# Patient Record
Sex: FEMALE | URBAN - METROPOLITAN AREA
[De-identification: names, ages, dates, MRNs, and addresses within clinical notes are randomized per-mention and may not be internally consistent; named-entity substitution may affect disease eponyms.]

---

## 2021-12-02 ENCOUNTER — INPATIENT (INPATIENT)
Facility: HOSPITAL | Age: 86
LOS: 1 days | DRG: 64 | End: 2021-12-04
Attending: PSYCHIATRY & NEUROLOGY | Admitting: PSYCHIATRY & NEUROLOGY
Payer: MEDICARE

## 2021-12-02 VITALS
HEIGHT: 62 IN | DIASTOLIC BLOOD PRESSURE: 78 MMHG | SYSTOLIC BLOOD PRESSURE: 111 MMHG | RESPIRATION RATE: 14 BRPM | OXYGEN SATURATION: 100 % | HEART RATE: 83 BPM | TEMPERATURE: 90 F

## 2021-12-02 DIAGNOSIS — I60.9 NONTRAUMATIC SUBARACHNOID HEMORRHAGE, UNSPECIFIED: ICD-10-CM

## 2021-12-02 LAB
A1C WITH ESTIMATED AVERAGE GLUCOSE RESULT: 5.9 % — HIGH (ref 4–5.6)
ALBUMIN SERPL ELPH-MCNC: 3.2 G/DL — LOW (ref 3.3–5)
ALBUMIN SERPL ELPH-MCNC: 4.1 G/DL — SIGNIFICANT CHANGE UP (ref 3.3–5)
ALP SERPL-CCNC: 79 U/L — SIGNIFICANT CHANGE UP (ref 40–120)
ALP SERPL-CCNC: 92 U/L — SIGNIFICANT CHANGE UP (ref 40–120)
ALT FLD-CCNC: 18 U/L — SIGNIFICANT CHANGE UP (ref 10–45)
ALT FLD-CCNC: 275 U/L — HIGH (ref 10–45)
AMPHET UR-MCNC: NEGATIVE — SIGNIFICANT CHANGE UP
ANION GAP SERPL CALC-SCNC: 17 MMOL/L — SIGNIFICANT CHANGE UP (ref 5–17)
ANION GAP SERPL CALC-SCNC: 17 MMOL/L — SIGNIFICANT CHANGE UP (ref 5–17)
ANION GAP SERPL CALC-SCNC: 23 MMOL/L — HIGH (ref 5–17)
ANION GAP SERPL CALC-SCNC: 25 MMOL/L — HIGH (ref 5–17)
APTT BLD: 22.6 SEC — LOW (ref 27.5–35.5)
APTT BLD: 24.4 SEC — LOW (ref 27.5–35.5)
AST SERPL-CCNC: 356 U/L — HIGH (ref 10–40)
AST SERPL-CCNC: 36 U/L — SIGNIFICANT CHANGE UP (ref 10–40)
B-OH-BUTYR SERPL-SCNC: 0.1 MMOL/L — SIGNIFICANT CHANGE UP
BARBITURATES UR SCN-MCNC: NEGATIVE — SIGNIFICANT CHANGE UP
BASOPHILS # BLD AUTO: 0.06 K/UL — SIGNIFICANT CHANGE UP (ref 0–0.2)
BASOPHILS NFR BLD AUTO: 0.4 % — SIGNIFICANT CHANGE UP (ref 0–2)
BENZODIAZ UR-MCNC: NEGATIVE — SIGNIFICANT CHANGE UP
BILIRUB SERPL-MCNC: 0.1 MG/DL — LOW (ref 0.2–1.2)
BILIRUB SERPL-MCNC: 0.3 MG/DL — SIGNIFICANT CHANGE UP (ref 0.2–1.2)
BLD GP AB SCN SERPL QL: NEGATIVE — SIGNIFICANT CHANGE UP
BUN SERPL-MCNC: 38 MG/DL — HIGH (ref 7–23)
BUN SERPL-MCNC: 38 MG/DL — HIGH (ref 7–23)
BUN SERPL-MCNC: 39 MG/DL — HIGH (ref 7–23)
BUN SERPL-MCNC: 39 MG/DL — HIGH (ref 7–23)
CALCIUM SERPL-MCNC: 13.4 MG/DL — CRITICAL HIGH (ref 8.4–10.5)
CALCIUM SERPL-MCNC: 8.5 MG/DL — SIGNIFICANT CHANGE UP (ref 8.4–10.5)
CALCIUM SERPL-MCNC: 9.1 MG/DL — SIGNIFICANT CHANGE UP (ref 8.4–10.5)
CALCIUM SERPL-MCNC: 9.4 MG/DL — SIGNIFICANT CHANGE UP (ref 8.4–10.5)
CHLORIDE SERPL-SCNC: 105 MMOL/L — SIGNIFICANT CHANGE UP (ref 96–108)
CHLORIDE SERPL-SCNC: 111 MMOL/L — HIGH (ref 96–108)
CHLORIDE SERPL-SCNC: 92 MMOL/L — LOW (ref 96–108)
CHLORIDE SERPL-SCNC: 97 MMOL/L — SIGNIFICANT CHANGE UP (ref 96–108)
CHOLEST SERPL-MCNC: 148 MG/DL — SIGNIFICANT CHANGE UP
CO2 SERPL-SCNC: 14 MMOL/L — LOW (ref 22–31)
CO2 SERPL-SCNC: 15 MMOL/L — LOW (ref 22–31)
CO2 SERPL-SCNC: 16 MMOL/L — LOW (ref 22–31)
CO2 SERPL-SCNC: 18 MMOL/L — LOW (ref 22–31)
COCAINE METAB.OTHER UR-MCNC: NEGATIVE — SIGNIFICANT CHANGE UP
CREAT SERPL-MCNC: 1.35 MG/DL — HIGH (ref 0.5–1.3)
CREAT SERPL-MCNC: 1.38 MG/DL — HIGH (ref 0.5–1.3)
CREAT SERPL-MCNC: 1.57 MG/DL — HIGH (ref 0.5–1.3)
CREAT SERPL-MCNC: 1.98 MG/DL — HIGH (ref 0.5–1.3)
EOSINOPHIL # BLD AUTO: 0.01 K/UL — SIGNIFICANT CHANGE UP (ref 0–0.5)
EOSINOPHIL NFR BLD AUTO: 0.1 % — SIGNIFICANT CHANGE UP (ref 0–6)
ESTIMATED AVERAGE GLUCOSE: 123 MG/DL — HIGH (ref 68–114)
ETHANOL SERPL-MCNC: SIGNIFICANT CHANGE UP MG/DL (ref 0–10)
GAS PNL BLDA: SIGNIFICANT CHANGE UP
GLUCOSE SERPL-MCNC: 171 MG/DL — HIGH (ref 70–99)
GLUCOSE SERPL-MCNC: 229 MG/DL — HIGH (ref 70–99)
GLUCOSE SERPL-MCNC: 422 MG/DL — HIGH (ref 70–99)
GLUCOSE SERPL-MCNC: 447 MG/DL — HIGH (ref 70–99)
HCT VFR BLD CALC: 27.2 % — LOW (ref 34.5–45)
HCT VFR BLD CALC: 28.7 % — LOW (ref 34.5–45)
HCT VFR BLD CALC: 33.1 % — LOW (ref 34.5–45)
HDLC SERPL-MCNC: 62 MG/DL — SIGNIFICANT CHANGE UP
HGB BLD-MCNC: 10.6 G/DL — LOW (ref 11.5–15.5)
HGB BLD-MCNC: 8.7 G/DL — LOW (ref 11.5–15.5)
HGB BLD-MCNC: 8.7 G/DL — LOW (ref 11.5–15.5)
IMM GRANULOCYTES NFR BLD AUTO: 0.5 % — SIGNIFICANT CHANGE UP (ref 0–1.5)
INR BLD: 0.95 RATIO — SIGNIFICANT CHANGE UP (ref 0.88–1.16)
INR BLD: 0.97 RATIO — SIGNIFICANT CHANGE UP (ref 0.88–1.16)
LACTATE SERPL-SCNC: 10.5 MMOL/L — CRITICAL HIGH (ref 0.7–2)
LACTATE SERPL-SCNC: 7.6 MMOL/L — CRITICAL HIGH (ref 0.7–2)
LIDOCAIN IGE QN: 108 U/L — HIGH (ref 7–60)
LIPID PNL WITH DIRECT LDL SERPL: 78 MG/DL — SIGNIFICANT CHANGE UP
LYMPHOCYTES # BLD AUTO: 1.9 K/UL — SIGNIFICANT CHANGE UP (ref 1–3.3)
LYMPHOCYTES # BLD AUTO: 13.6 % — SIGNIFICANT CHANGE UP (ref 13–44)
MAGNESIUM SERPL-MCNC: 2.4 MG/DL — SIGNIFICANT CHANGE UP (ref 1.6–2.6)
MAGNESIUM SERPL-MCNC: 2.6 MG/DL — SIGNIFICANT CHANGE UP (ref 1.6–2.6)
MAGNESIUM SERPL-MCNC: 2.8 MG/DL — HIGH (ref 1.6–2.6)
MCHC RBC-ENTMCNC: 30.3 GM/DL — LOW (ref 32–36)
MCHC RBC-ENTMCNC: 30.9 PG — SIGNIFICANT CHANGE UP (ref 27–34)
MCHC RBC-ENTMCNC: 32 GM/DL — SIGNIFICANT CHANGE UP (ref 32–36)
MCHC RBC-ENTMCNC: 32 GM/DL — SIGNIFICANT CHANGE UP (ref 32–36)
MCV RBC AUTO: 101.8 FL — HIGH (ref 80–100)
MCV RBC AUTO: 96.5 FL — SIGNIFICANT CHANGE UP (ref 80–100)
MCV RBC AUTO: 96.5 FL — SIGNIFICANT CHANGE UP (ref 80–100)
METHADONE UR-MCNC: NEGATIVE — SIGNIFICANT CHANGE UP
MONOCYTES # BLD AUTO: 1.36 K/UL — HIGH (ref 0–0.9)
MONOCYTES NFR BLD AUTO: 9.7 % — SIGNIFICANT CHANGE UP (ref 2–14)
NEUTROPHILS # BLD AUTO: 10.58 K/UL — HIGH (ref 1.8–7.4)
NEUTROPHILS NFR BLD AUTO: 75.7 % — SIGNIFICANT CHANGE UP (ref 43–77)
NON HDL CHOLESTEROL: 85 MG/DL — SIGNIFICANT CHANGE UP
NRBC # BLD: 0 /100 WBCS — SIGNIFICANT CHANGE UP (ref 0–0)
OPIATES UR-MCNC: NEGATIVE — SIGNIFICANT CHANGE UP
OXYCODONE UR-MCNC: NEGATIVE — SIGNIFICANT CHANGE UP
PCP SPEC-MCNC: SIGNIFICANT CHANGE UP
PCP UR-MCNC: NEGATIVE — SIGNIFICANT CHANGE UP
PHOSPHATE SERPL-MCNC: 3.4 MG/DL — SIGNIFICANT CHANGE UP (ref 2.5–4.5)
PHOSPHATE SERPL-MCNC: 3.5 MG/DL — SIGNIFICANT CHANGE UP (ref 2.5–4.5)
PHOSPHATE SERPL-MCNC: 5 MG/DL — HIGH (ref 2.5–4.5)
PLATELET # BLD AUTO: 125 K/UL — LOW (ref 150–400)
PLATELET # BLD AUTO: 126 K/UL — LOW (ref 150–400)
PLATELET # BLD AUTO: 134 K/UL — LOW (ref 150–400)
POTASSIUM SERPL-MCNC: 3.4 MMOL/L — LOW (ref 3.5–5.3)
POTASSIUM SERPL-MCNC: 3.6 MMOL/L — SIGNIFICANT CHANGE UP (ref 3.5–5.3)
POTASSIUM SERPL-MCNC: 5.5 MMOL/L — HIGH (ref 3.5–5.3)
POTASSIUM SERPL-MCNC: 6.5 MMOL/L — CRITICAL HIGH (ref 3.5–5.3)
POTASSIUM SERPL-SCNC: 3.4 MMOL/L — LOW (ref 3.5–5.3)
POTASSIUM SERPL-SCNC: 3.6 MMOL/L — SIGNIFICANT CHANGE UP (ref 3.5–5.3)
POTASSIUM SERPL-SCNC: 5.5 MMOL/L — HIGH (ref 3.5–5.3)
POTASSIUM SERPL-SCNC: 6.5 MMOL/L — CRITICAL HIGH (ref 3.5–5.3)
PROT SERPL-MCNC: 6.2 G/DL — SIGNIFICANT CHANGE UP (ref 6–8.3)
PROT SERPL-MCNC: 7.9 G/DL — SIGNIFICANT CHANGE UP (ref 6–8.3)
PROTHROM AB SERPL-ACNC: 11.4 SEC — SIGNIFICANT CHANGE UP (ref 10.6–13.6)
PROTHROM AB SERPL-ACNC: 11.6 SEC — SIGNIFICANT CHANGE UP (ref 10.6–13.6)
RBC # BLD: 2.82 M/UL — LOW (ref 3.8–5.2)
RBC # BLD: 2.82 M/UL — LOW (ref 3.8–5.2)
RBC # BLD: 3.43 M/UL — LOW (ref 3.8–5.2)
RBC # FLD: 14.1 % — SIGNIFICANT CHANGE UP (ref 10.3–14.5)
RBC # FLD: 14.1 % — SIGNIFICANT CHANGE UP (ref 10.3–14.5)
RBC # FLD: 14.6 % — HIGH (ref 10.3–14.5)
RH IG SCN BLD-IMP: POSITIVE — SIGNIFICANT CHANGE UP
SARS-COV-2 RNA SPEC QL NAA+PROBE: SIGNIFICANT CHANGE UP
SODIUM SERPL-SCNC: 131 MMOL/L — LOW (ref 135–145)
SODIUM SERPL-SCNC: 137 MMOL/L — SIGNIFICANT CHANGE UP (ref 135–145)
SODIUM SERPL-SCNC: 140 MMOL/L — SIGNIFICANT CHANGE UP (ref 135–145)
SODIUM SERPL-SCNC: 142 MMOL/L — SIGNIFICANT CHANGE UP (ref 135–145)
T3 SERPL-MCNC: 77 NG/DL — LOW (ref 80–200)
T4 AB SER-ACNC: 6.1 UG/DL — SIGNIFICANT CHANGE UP (ref 4.6–12)
THC UR QL: NEGATIVE — SIGNIFICANT CHANGE UP
TRIGL SERPL-MCNC: 36 MG/DL — SIGNIFICANT CHANGE UP
TSH SERPL-MCNC: 2.51 UIU/ML — SIGNIFICANT CHANGE UP (ref 0.27–4.2)
WBC # BLD: 11.88 K/UL — HIGH (ref 3.8–10.5)
WBC # BLD: 13.98 K/UL — HIGH (ref 3.8–10.5)
WBC # BLD: 7.85 K/UL — SIGNIFICANT CHANGE UP (ref 3.8–10.5)
WBC # FLD AUTO: 11.88 K/UL — HIGH (ref 3.8–10.5)
WBC # FLD AUTO: 13.98 K/UL — HIGH (ref 3.8–10.5)
WBC # FLD AUTO: 7.85 K/UL — SIGNIFICANT CHANGE UP (ref 3.8–10.5)

## 2021-12-02 PROCEDURE — 71045 X-RAY EXAM CHEST 1 VIEW: CPT | Mod: 26

## 2021-12-02 PROCEDURE — 70496 CT ANGIOGRAPHY HEAD: CPT | Mod: 26

## 2021-12-02 PROCEDURE — 99223 1ST HOSP IP/OBS HIGH 75: CPT

## 2021-12-02 PROCEDURE — 99291 CRITICAL CARE FIRST HOUR: CPT | Mod: 25

## 2021-12-02 PROCEDURE — 71260 CT THORAX DX C+: CPT | Mod: 26,MA

## 2021-12-02 PROCEDURE — 99291 CRITICAL CARE FIRST HOUR: CPT

## 2021-12-02 PROCEDURE — 72125 CT NECK SPINE W/O DYE: CPT | Mod: 26

## 2021-12-02 PROCEDURE — 93010 ELECTROCARDIOGRAM REPORT: CPT

## 2021-12-02 PROCEDURE — 93306 TTE W/DOPPLER COMPLETE: CPT | Mod: 26

## 2021-12-02 PROCEDURE — 99285 EMERGENCY DEPT VISIT HI MDM: CPT

## 2021-12-02 PROCEDURE — 36620 INSERTION CATHETER ARTERY: CPT

## 2021-12-02 PROCEDURE — 36556 INSERT NON-TUNNEL CV CATH: CPT

## 2021-12-02 PROCEDURE — 70498 CT ANGIOGRAPHY NECK: CPT | Mod: 26

## 2021-12-02 PROCEDURE — 74177 CT ABD & PELVIS W/CONTRAST: CPT | Mod: 26

## 2021-12-02 PROCEDURE — 72170 X-RAY EXAM OF PELVIS: CPT | Mod: 26

## 2021-12-02 RX ORDER — SODIUM ZIRCONIUM CYCLOSILICATE 10 G/10G
10 POWDER, FOR SUSPENSION ORAL ONCE
Refills: 0 | Status: COMPLETED | OUTPATIENT
Start: 2021-12-02 | End: 2021-12-02

## 2021-12-02 RX ORDER — FENTANYL CITRATE 50 UG/ML
25 INJECTION INTRAVENOUS ONCE
Refills: 0 | Status: DISCONTINUED | OUTPATIENT
Start: 2021-12-02 | End: 2021-12-02

## 2021-12-02 RX ORDER — SODIUM CHLORIDE 9 MG/ML
1000 INJECTION INTRAMUSCULAR; INTRAVENOUS; SUBCUTANEOUS ONCE
Refills: 0 | Status: COMPLETED | OUTPATIENT
Start: 2021-12-02 | End: 2021-12-02

## 2021-12-02 RX ORDER — LORATADINE 10 MG/1
10 TABLET ORAL DAILY
Refills: 0 | Status: DISCONTINUED | OUTPATIENT
Start: 2021-12-02 | End: 2021-12-02

## 2021-12-02 RX ORDER — NOREPINEPHRINE BITARTRATE/D5W 8 MG/250ML
0.05 PLASTIC BAG, INJECTION (ML) INTRAVENOUS
Qty: 16 | Refills: 0 | Status: DISCONTINUED | OUTPATIENT
Start: 2021-12-02 | End: 2021-12-03

## 2021-12-02 RX ORDER — MEMANTINE HYDROCHLORIDE 10 MG/1
5 TABLET ORAL DAILY
Refills: 0 | Status: DISCONTINUED | OUTPATIENT
Start: 2021-12-02 | End: 2021-12-02

## 2021-12-02 RX ORDER — HYDRALAZINE HCL 50 MG
10 TABLET ORAL ONCE
Refills: 0 | Status: DISCONTINUED | OUTPATIENT
Start: 2021-12-02 | End: 2021-12-02

## 2021-12-02 RX ORDER — FUROSEMIDE 40 MG
10 TABLET ORAL ONCE
Refills: 0 | Status: COMPLETED | OUTPATIENT
Start: 2021-12-02 | End: 2021-12-02

## 2021-12-02 RX ORDER — INSULIN HUMAN 100 [IU]/ML
2 INJECTION, SOLUTION SUBCUTANEOUS
Qty: 100 | Refills: 0 | Status: DISCONTINUED | OUTPATIENT
Start: 2021-12-02 | End: 2021-12-03

## 2021-12-02 RX ORDER — SODIUM CHLORIDE 9 MG/ML
1000 INJECTION, SOLUTION INTRAVENOUS
Refills: 0 | Status: DISCONTINUED | OUTPATIENT
Start: 2021-12-02 | End: 2021-12-02

## 2021-12-02 RX ORDER — DEXTROSE 50 % IN WATER 50 %
50 SYRINGE (ML) INTRAVENOUS
Refills: 0 | Status: DISCONTINUED | OUTPATIENT
Start: 2021-12-02 | End: 2021-12-02

## 2021-12-02 RX ORDER — LATANOPROST 0.05 MG/ML
1 SOLUTION/ DROPS OPHTHALMIC; TOPICAL AT BEDTIME
Refills: 0 | Status: DISCONTINUED | OUTPATIENT
Start: 2021-12-02 | End: 2021-12-04

## 2021-12-02 RX ORDER — INSULIN LISPRO 100/ML
VIAL (ML) SUBCUTANEOUS EVERY 6 HOURS
Refills: 0 | Status: DISCONTINUED | OUTPATIENT
Start: 2021-12-02 | End: 2021-12-02

## 2021-12-02 RX ORDER — HYDRALAZINE HCL 50 MG
10 TABLET ORAL ONCE
Refills: 0 | Status: COMPLETED | OUTPATIENT
Start: 2021-12-02 | End: 2021-12-02

## 2021-12-02 RX ORDER — CHLORHEXIDINE GLUCONATE 213 G/1000ML
15 SOLUTION TOPICAL EVERY 12 HOURS
Refills: 0 | Status: DISCONTINUED | OUTPATIENT
Start: 2021-12-02 | End: 2021-12-04

## 2021-12-02 RX ORDER — INSULIN HUMAN 100 [IU]/ML
6 INJECTION, SOLUTION SUBCUTANEOUS
Qty: 50 | Refills: 0 | Status: DISCONTINUED | OUTPATIENT
Start: 2021-12-02 | End: 2021-12-02

## 2021-12-02 RX ORDER — PANTOPRAZOLE SODIUM 20 MG/1
40 TABLET, DELAYED RELEASE ORAL DAILY
Refills: 0 | Status: DISCONTINUED | OUTPATIENT
Start: 2021-12-02 | End: 2021-12-04

## 2021-12-02 RX ORDER — CHLORHEXIDINE GLUCONATE 213 G/1000ML
1 SOLUTION TOPICAL ONCE
Refills: 0 | Status: COMPLETED | OUTPATIENT
Start: 2021-12-02 | End: 2021-12-03

## 2021-12-02 RX ORDER — DEXTROSE 50 % IN WATER 50 %
50 SYRINGE (ML) INTRAVENOUS
Refills: 0 | Status: DISCONTINUED | OUTPATIENT
Start: 2021-12-02 | End: 2021-12-04

## 2021-12-02 RX ORDER — INSULIN HUMAN 100 [IU]/ML
6 INJECTION, SOLUTION SUBCUTANEOUS
Qty: 100 | Refills: 0 | Status: DISCONTINUED | OUTPATIENT
Start: 2021-12-02 | End: 2021-12-02

## 2021-12-02 RX ORDER — SENNA PLUS 8.6 MG/1
2 TABLET ORAL AT BEDTIME
Refills: 0 | Status: DISCONTINUED | OUTPATIENT
Start: 2021-12-02 | End: 2021-12-04

## 2021-12-02 RX ORDER — SODIUM CHLORIDE 5 G/100ML
1000 INJECTION, SOLUTION INTRAVENOUS
Refills: 0 | Status: DISCONTINUED | OUTPATIENT
Start: 2021-12-02 | End: 2021-12-02

## 2021-12-02 RX ORDER — SODIUM CHLORIDE 9 MG/ML
2 INJECTION INTRAMUSCULAR; INTRAVENOUS; SUBCUTANEOUS EVERY 6 HOURS
Refills: 0 | Status: DISCONTINUED | OUTPATIENT
Start: 2021-12-02 | End: 2021-12-04

## 2021-12-02 RX ORDER — LACOSAMIDE 50 MG/1
100 TABLET ORAL EVERY 12 HOURS
Refills: 0 | Status: DISCONTINUED | OUTPATIENT
Start: 2021-12-02 | End: 2021-12-04

## 2021-12-02 RX ORDER — POTASSIUM CHLORIDE 20 MEQ
40 PACKET (EA) ORAL ONCE
Refills: 0 | Status: COMPLETED | OUTPATIENT
Start: 2021-12-02 | End: 2021-12-02

## 2021-12-02 RX ORDER — POTASSIUM CHLORIDE 20 MEQ
10 PACKET (EA) ORAL EVERY 4 HOURS
Refills: 0 | Status: DISCONTINUED | OUTPATIENT
Start: 2021-12-02 | End: 2021-12-02

## 2021-12-02 RX ORDER — LEVOTHYROXINE SODIUM 125 MCG
88 TABLET ORAL DAILY
Refills: 0 | Status: DISCONTINUED | OUTPATIENT
Start: 2021-12-03 | End: 2021-12-04

## 2021-12-02 RX ORDER — SENNA PLUS 8.6 MG/1
2 TABLET ORAL AT BEDTIME
Refills: 0 | Status: DISCONTINUED | OUTPATIENT
Start: 2021-12-02 | End: 2021-12-02

## 2021-12-02 RX ORDER — SODIUM CHLORIDE 5 G/100ML
1000 INJECTION, SOLUTION INTRAVENOUS
Refills: 0 | Status: DISCONTINUED | OUTPATIENT
Start: 2021-12-02 | End: 2021-12-03

## 2021-12-02 RX ORDER — DONEPEZIL HYDROCHLORIDE 10 MG/1
10 TABLET, FILM COATED ORAL AT BEDTIME
Refills: 0 | Status: DISCONTINUED | OUTPATIENT
Start: 2021-12-02 | End: 2021-12-02

## 2021-12-02 RX ADMIN — Medication 40 MILLIEQUIVALENT(S): at 13:13

## 2021-12-02 RX ADMIN — Medication 100 MILLIEQUIVALENT(S): at 17:03

## 2021-12-02 RX ADMIN — Medication 10 MILLIGRAM(S): at 18:05

## 2021-12-02 RX ADMIN — Medication 10 MILLIGRAM(S): at 20:42

## 2021-12-02 RX ADMIN — LACOSAMIDE 120 MILLIGRAM(S): 50 TABLET ORAL at 17:10

## 2021-12-02 RX ADMIN — SODIUM CHLORIDE 50 MILLILITER(S): 5 INJECTION, SOLUTION INTRAVENOUS at 13:14

## 2021-12-02 RX ADMIN — SODIUM CHLORIDE 1000 MILLILITER(S): 9 INJECTION INTRAMUSCULAR; INTRAVENOUS; SUBCUTANEOUS at 16:11

## 2021-12-02 RX ADMIN — CHLORHEXIDINE GLUCONATE 15 MILLILITER(S): 213 SOLUTION TOPICAL at 17:03

## 2021-12-02 RX ADMIN — PANTOPRAZOLE SODIUM 40 MILLIGRAM(S): 20 TABLET, DELAYED RELEASE ORAL at 13:35

## 2021-12-02 RX ADMIN — SODIUM CHLORIDE 2 GRAM(S): 9 INJECTION INTRAMUSCULAR; INTRAVENOUS; SUBCUTANEOUS at 17:03

## 2021-12-02 RX ADMIN — INSULIN HUMAN 6 UNIT(S)/HR: 100 INJECTION, SOLUTION SUBCUTANEOUS at 13:14

## 2021-12-02 RX ADMIN — Medication 100 MILLIEQUIVALENT(S): at 13:34

## 2021-12-02 RX ADMIN — Medication 50 MILLILITER(S): at 23:03

## 2021-12-02 RX ADMIN — FENTANYL CITRATE 25 MICROGRAM(S): 50 INJECTION INTRAVENOUS at 18:15

## 2021-12-02 RX ADMIN — SODIUM ZIRCONIUM CYCLOSILICATE 10 GRAM(S): 10 POWDER, FOR SUSPENSION ORAL at 23:57

## 2021-12-02 NOTE — PROVIDER CONTACT NOTE (OTHER) - REASON
patient bradycardic and unable to obtain noninvasive blood pressure
Patient tachycardic and newly converted to afib and tachypenic

## 2021-12-02 NOTE — ED PROVIDER NOTE - PROGRESS NOTE DETAILS
nsx requesting mannitol - trauma agreeable bc low suspcion for other traumatic injury -- isai nsx - will start cardene again in icu after trauma scan read --

## 2021-12-02 NOTE — CONSULT NOTE ADULT - ATTENDING COMMENTS
Pt seen and examined during level one trauma activation. Pt transferred from [facility] - found in bathtub, unconscious. Was intubated (unknown if in field or at [place]), was found to be hypertensive and CT head showed significant ICH, so started on Cardene, received Keppra. Cardene then stopped for hypotension. Pt received etomidate/ theresa for intubation and no other sedative medications afterwards.     In trauma bay:  Airway confirmed with ETCO2  B/L BS present, O2 sat 100% on FiO2 100% (vent)  SBP 130s-150s, HR 70s, pIV access established  GCS 3T, not moving any extremities, pupils unequal but both unresponsive to light  Full exposure    Secondary survey with no signs of traumatic injury. No rectal tone on SHANIA.    CXR not done in trauma bay as pt with B/L BS and intention to obtain repeat CTs asap. CT head, C-spine, CTA head/neck, C/A/P obtained to look for intracranial aneurysm as well as traumatic injuries. Pt then taken to NSCU. Pt d/w Dr. Andrews (ED attending) and NSG.    On my review of CT images, large R SDH/IPH/SAH/IVH with 1.7cm leftward shift. No aneurysm noted. No traumatic injuries noted. Labs reviewed.    - Given pt's extremely grim prognosis (advanced age, poor GCS, significant ICH) and negative imaging, no further trauma workup is indicated.

## 2021-12-02 NOTE — CONSULT NOTE ADULT - ASSESSMENT
ASSESSMENT:  89y old f found down with intracranial hemorrhage seen on CTH in outside hospital, patient was unresponsive, intubated there, transferred to [facility] for neurosurgery/neuro ICU care. Level 1 trauma called here. Primary survey intact, airway secured. Secondary survey b/l dilated unresponsive pupils, absent rectal tone. No external signs of trauma identified on primary/secondary survey.    PLAN:    - follow up full trauma imaging  - evaluated by neurosurgery in trauma bay  - planned admission to neuro ICU  - no external signs of traumatic injuries identified on primary and secondary survey ASSESSMENT:  89y old f found down with intracranial hemorrhage seen on CTH in outside hospital, patient was unresponsive, intubated there, transferred to [facility] for neurosurgery/neuro ICU care. Level 1 trauma called here. Primary survey intact, airway secured. Secondary survey b/l dilated unresponsive pupils, absent rectal tone. No external signs of trauma identified on primary/secondary survey.    PLAN:    - follow up full trauma imaging  - evaluated by neurosurgery in trauma bay  - planned admission to neuro ICU  - mannitol for ICP management  - no external signs of traumatic injuries identified on primary and secondary survey

## 2021-12-02 NOTE — ED PROVIDER NOTE - CARE PLAN
1 Principal Discharge DX:	Subarachnoid hemorrhage   Principal Discharge DX:	Subarachnoid hemorrhage  Secondary Diagnosis:	ICH (intracerebral hemorrhage)

## 2021-12-02 NOTE — CHART NOTE - NSCHARTNOTEFT_GEN_A_CORE
Patient with bradycardia which progressed to PEA arrest. CPR was immediately started with 4 rounds of epinephrine IV given. Bedside RN noted that patient given several runs of potassium during day shift, so given concern for hyperkalemia 2g calcium chloride given. Efforts to obtain labs but unable. ROSC obtained in 12 minutes. She was then noted be in SVT. Emergent central line and arterial line placed. Labs sent. She became hypotensive and again went into PEA arrest at 9:15PM with immediate CPR started. She got 2 rounds of epi and ROSC obtained in 4 minutes. She was in SVT. Given concern she would become hypotensive again, pressors were brought available in case of need. She did become hypotensive, so phenylephrine gtt started. She did not respond to this, so norepinephrine gtt started. Given concern for acidosis post arrest x 2, given 2amps bicarbonate. ABG with K+ of 7.8 and bedside FS in 230s. Started on insulin gtt and ordered for Lokelma. Awaiting BMP result to follow K+. Daughter called to update. Patient made DNR given poor neuro prognosis on admission and current tenuous hemodynamic state.

## 2021-12-02 NOTE — PROGRESS NOTE ADULT - ASSESSMENT
ASSESSMENT: ICH/IVH, brain compression  DKA    NEURO:  - Nchecks q1h  - Family will arrive in 1 hour, ongoing GOC with family  - not a candidate for surgical intervention    PULM:  - ETT to vent  - vap bundle  - repeat gas    CV:  - Keep -160mmHg  - repeat EKG  - Tammie, TLC    RENAL:   - HTS 2% 50 for goal 145-155  - Montes  - repeat BMP, EKG  - strict IOs    GI:  - NPO, NGT  - GI prophylaxis PPI  - Bowel regimen     ENDO:   s/p insulin drip for DKA during day  - Goal euglycemia (-180)  - monitor FS      HEME/ONC:  - SCDs   - hold chemoprophylaxis due to fresh bleed    ID:  - monitor for fevers, hypothermic during the day placed on warming blanket  - f/u cultures    MISC:    SOCIAL/FAMILY:  [x] daughter Argelia updated over the phone, will be here tonight    CODE STATUS:  [] Full Code [x] DNR [] DNI [] Palliative/Comfort Care    DISPOSITION:  [x] ICU [] Stroke Unit [] Floor [] EMU [] RCU [] PCU    [x] Patient is at high risk of neurologic deterioration/death due to: ICH/IVH, brain compression, respiratory failure, cardiac arrest    Contact: [phone] ASSESSMENT: ICH/IVH, brain compression  DKA    NEURO:  - Nchecks q1h  - Family will arrive in 1 hour, ongoing GOC with family  - not a candidate for surgical intervention    PULM:  - ETT to vent  - vap bundle  - repeat gas  - CXR to assess tube positioning/fx post chest compressions    CV:  - Keep -160mmHg  - repeat EKG  - Tammie, TLC  - trend lactate, IVB  - troponin    RENAL:   - HTS 2% 50 for goal 145-155  - Montes  - repeat BMP, EKG  - strict IOs    GI:  - NPO, NGT  - GI prophylaxis PPI  - Bowel regimen     ENDO:   s/p insulin drip for DKA during day  - Goal euglycemia (-180)  - monitor FS      HEME/ONC:  - SCDs   - hold chemoprophylaxis due to fresh bleed    ID:  - monitor for fevers, hypothermic during the day placed on warming blanket  - f/u cultures    MISC:    SOCIAL/FAMILY:  [x] daughter Argelia updated over the phone, will be here tonight    CODE STATUS:  [] Full Code [x] DNR [] DNI [] Palliative/Comfort Care    DISPOSITION:  [x] ICU [] Stroke Unit [] Floor [] EMU [] RCU [] PCU    [x] Patient is at high risk of neurologic deterioration/death due to: ICH/IVH, brain compression, respiratory failure, cardiac arrest    Contact: [phone] ASSESSMENT: ICH/IVH, brain compression  DKA    NEURO:  - Nchecks q1h  - Family will arrive in 1 hour, ongoing GOC with family  - not a candidate for surgical intervention    PULM:  - ETT to vent  - vap bundle  - repeat gas  - CXR to assess tube positioning/fx post chest compressions    CV:  - Keep -160mmHg  - repeat EKG  - Tammie, TLC  - trend lactate, IVB  - troponin    RENAL:   - HTS 2% 50 for goal 145-155  - Montes  - repeat BMP, EKG  - strict IOs  - Monitor K+    GI:  - NPO, NGT  - GI prophylaxis PPI  - Bowel regimen     ENDO:   s/p insulin drip for DKA during day  - Goal euglycemia (-180)  - monitor FS      HEME/ONC:  - SCDs   - hold chemoprophylaxis due to fresh bleed    ID:  - monitor for fevers, hypothermic during the day placed on warming blanket  - f/u cultures    MISC:    SOCIAL/FAMILY:  [x] daughter Argelia updated over the phone, will be here tonight    CODE STATUS:  [] Full Code [x] DNR [] DNI [] Palliative/Comfort Care    DISPOSITION:  [x] ICU [] Stroke Unit [] Floor [] EMU [] RCU [] PCU    [x] Patient is at high risk of neurologic deterioration/death due to: ICH/IVH, brain compression, respiratory failure, cardiac arrest    Contact: [phone]

## 2021-12-02 NOTE — DISCHARGE NOTE NURSING/CASE MANAGEMENT/SOCIAL WORK - PATIENT PORTAL LINK FT
You can access the FollowMyHealth Patient Portal offered by [facility] by registering at the following website: [email or web address]. By joining [facility]’s FollowMyHealth portal, you will also be able to view your health information using other applications (apps) compatible with our system.

## 2021-12-02 NOTE — ED PROVIDER NOTE - PHYSICAL EXAMINATION
Gen: intubated, off sedation unresponsive   HEENT: EOMI, no nasal discharge, mucous membranes moist  CV: RRR, +S1/S2, no M/R/G  Resp: CTAB, no W/R/R  GI: Abdomen soft non-distended, NTTP, no masses  MSK: No open wounds, no bruising, no LE edema  Neuro: A0x0, off sedation w/o extremity movement, pupils fixed/unequal

## 2021-12-02 NOTE — DISCHARGE NOTE FOR THE EXPIRED PATIENT - HOSPITAL COURSE
89 year old female with past medical history of hypertension and hypothyroidism, transferred from [facility] on 12/2/21 after being found unresponsive at home and on CT Brain in outside facility found to have a large  89 year old female with past medical history of hypertension and hypothyroidism, transferred from [facility] on 21 after being found unresponsive at home and on CT Brain in outside facility found to have a large right intraparenchymal hemorrhage with SDH with IVH, exam on arrival into the ED was very poor: intubated, on no sedation, non-reactive, anisocoric pupils, no corneals, no cough/no gag, not overbreathing, flaccid x 4. Goals of care discussion was made with daughter regarding patient's poor exam and devastating brain hemorrhage which daughter understood. Patient had multiple laboratory values that were abnormal secondary to renal failure, and were being corrected. On the evening of 21 patient became bradycardic which progressed to PEA arrest, CPR done with 4 rounds of epinephrine given, ROSC was achieved in 12 minutes, however she became hypotensive again and CPR done again with 2 rounds of epinephrine given and ROSC achieved in 4 minutes. Patient placed on pressors. Further goals of care discussion with daughter were made and on 21 daughter brought in multiple family members and decided on terminal extubation. Patient  on 21 at 17:02, family spoken with at bedside. Medical Examiner called.  89 year old female with past medical history of hypertension and hypothyroidism, transferred from [facility] on 21 after being found unresponsive at home and on CT Brain in outside facility found to have a large right intraparenchymal hemorrhage with SDH with IVH, exam on arrival into the ED was very poor: intubated, on no sedation, non-reactive, anisocoric pupils, no corneals, no cough/no gag, not overbreathing, flaccid x 4. Goals of care discussion was made with daughter regarding patient's poor exam and devastating brain hemorrhage which daughter understood. Patient had multiple laboratory values that were abnormal secondary to renal failure, and were being corrected. On the evening of 21 patient became bradycardic which progressed to PEA arrest, CPR done with 4 rounds of epinephrine given, ROSC was achieved in 12 minutes, however she became hypotensive again and CPR done again with 2 rounds of epinephrine given and ROSC achieved in 4 minutes. Patient placed on pressors. Further goals of care discussion with daughter were made and on 21 daughter brought in multiple family members and decided on terminal extubation. Patient  on 21 at 17:02, family spoken with at bedside. Medical Examiner called Aditya Jordan and patient not candidate for ME.

## 2021-12-02 NOTE — CONSULT NOTE ADULT - SUBJECTIVE AND OBJECTIVE BOX
89y f  89y f    TRAUMA ACTIVATION LEVEL:  1    MECHANISM OF INJURY: found down    GCS: 3T    HPI:  89y old f found down transferred from outside hospital unresponsive, intubated, CTH in outside hospital found with intraparenchymal and subarachnoid hemorrhage, transferred here for planned admission to neuro ICU. Level 1 trauma was called upon arrival.    PAST MEDICAL & SURGICAL HISTORY:  unable to obtain    Allergies  No Known Allergies    ROS: unable to obtain    Primary Survey:    A - intubated prior to arrival  B - bilateral breath sounds and good chest rise  C - palpable pulses in all extremities  D - GCS 3T on arrival  Exposure obtained    Vital Signs Last 24 Hrs    Secondary Survey:   General: unresponsive  HEENT: b/l pupils dilated non responsive  Neck: Soft, midline trachea  Chest: No chest wall tenderness. or subq  emphysema   Cardiac: S1, S2, RRR  Respiratory: Bilateral breath sounds, clear and equal bilaterally  Abdomen: Soft, non-distended, non-tender, no rebound,   Groin: Normal appearing, pelvis stable   Ext: palp radial b/l UE, b/l DP palp in Lower Extrem.   Back: no TTP, no palpable runoff/stepoff/deformity  Rectal: No milind blood, SHANIA with absent tone    Labs pending    RADIOLOGY & ADDITIONAL STUDIES:  pending  ---------------------------------------------------------------------------------------       89y f  89y f    TRAUMA ACTIVATION LEVEL:  1    MECHANISM OF INJURY: found down    GCS: 3T    HPI:  89y old f found down transferred from outside hospital unresponsive, intubated, CTH in outside hospital found with intraparenchymal and subarachnoid hemorrhage, transferred here for planned admission to neuro ICU. Level 1 trauma was called upon arrival.    Unable to obtain history, ROS, or HPI from patient as she was unconscious. History from chart when available.    PAST MEDICAL & SURGICAL HISTORY:  unable to obtain    Allergies  No Known Allergies    ROS: unable to obtain    Primary Survey:    A - intubated prior to arrival  B - bilateral breath sounds and good chest rise  C - palpable pulses in all extremities  D - GCS 3T on arrival  Exposure obtained    Vital Signs Last 24 Hrs    Secondary Survey:   General: unresponsive  HEENT: b/l pupils dilated non responsive  Neck: Soft, midline trachea  Chest: No chest wall tenderness. or subq  emphysema   Cardiac: S1, S2, RRR  Respiratory: Bilateral breath sounds, clear and equal bilaterally  Abdomen: Soft, non-distended, non-tender, no rebound,   Groin: Normal appearing, pelvis stable   Ext: palp radial b/l UE, b/l DP palp in Lower Extrem.   Back: no TTP, no palpable runoff/stepoff/deformity  Rectal: No milind blood, SHANIA with absent tone    Labs pending    RADIOLOGY & ADDITIONAL STUDIES:  pending  ---------------------------------------------------------------------------------------

## 2021-12-02 NOTE — DISCHARGE NOTE NURSING/CASE MANAGEMENT/SOCIAL WORK - NSDCPEFALRISK_GEN_ALL_CORE
For information on Fall & Injury Prevention, visit: [email or web address] OR  [email or web address] OR  https://www.cdc.gov/steadi/patient.html

## 2021-12-02 NOTE — PHARMACOTHERAPY INTERVENTION NOTE - COMMENTS
Contacted patient's pharmacy [facility] [phone] and received filled history   Home Medications:  donepezil 10 mg oral tablet: 1 tab(s) orally once a day (at bedtime) (02 Dec 2021 12:42)  loratadine 10 mg oral tablet: 1 tab(s) orally once a day (02 Dec 2021 12:42)  memantine 5 mg oral tablet: 1 tab(s) orally once a day (02 Dec 2021 12:42)  Synthroid 88 mcg (0.088 mg) oral tablet: 1 tab(s) orally once a day (02 Dec 2021 12:42)  Travatan 0.004% ophthalmic solution: 1 drop(s) in each eye once a day (in the evening) (02 Dec 2021 12:42)  valsartan 320 mg oral tablet: 1 tab(s) orally once a day (02 Dec 2021 12:42)

## 2021-12-02 NOTE — CONSULT NOTE ADULT - ASSESSMENT
MALABETDURAN, COLLETTE  89F hx HTN xfer [facility]. Found unresponsive. No family contact. Reportedly no blood thinner. Exam: int, not sedated, R pupil 5 NR, L pupil 3 NR, no BS reflexes, over breathing vent, not moving anything. CTH: large R IPH w/ SDH, sig shift, IVH R>L.  -adm nscu under intensivist, q1h neuro checks  -not offering surgical intervention given radiographic and clinical findings.   -care per NSCU

## 2021-12-02 NOTE — ED PROVIDER NOTE - OBJECTIVE STATEMENT
89F hx HTN hypothyroid transferred from [facility] for SAH/ICH. Pt reportedly found in bathrub unresponsive after LKN had been evening prior. BP initially 200 systolic. Pt was intubated at OSH and started on cardene however drip was stopped PTA 2/2 hypotension. Per EMS pt received keppra, EMS was told pt had 1.5 cm shift (no CD/imaging transferred to our facility). Level 1 was called 2/2 concern for ruptured aneurysm vs AVM. exam as below.

## 2021-12-02 NOTE — PROVIDER CONTACT NOTE (OTHER) - ASSESSMENT
patient bradycardic and unable to obtain noninvasive blood pressure
patient newly converted to afib and tachypenic

## 2021-12-02 NOTE — PROVIDER CONTACT NOTE (OTHER) - SITUATION
patient newly converted to afib and tachypenic
patient bradycardic and unable to obtain noninvasive blood pressure

## 2021-12-02 NOTE — CONSULT NOTE ADULT - SUBJECTIVE AND OBJECTIVE BOX
p (1480)     HPI:  89F hx ?HTN, ?hypothyroid, xfer from [place] w/ large brain bleed. Found unresponsive in bathtub. Initially . Intubated and started on cardene, d/c'd d/t/ hypotnesion. Arrives via EMS. Reportedly no AC/AP. Received Keppra at . No family present or contact info.     Exam: int, not sedated, R pupil 5 NR, L pupil 3 NR, no BS reflexes, over breathing vent, not moving anything.   --Anticoagulation:    =====================  PAST MEDICAL HISTORY     PAST SURGICAL HISTORY         MEDICATIONS:  Antibiotics:    Neuro:    Other:      SOCIAL HISTORY:   Occupation:   Marital Status:     FAMILY HISTORY:      ROS: Negative except per HPI    LABS:                          10.6   13.98 )-----------( 134      ( 02 Dec 2021 10:39 )             33.1

## 2021-12-02 NOTE — PROGRESS NOTE ADULT - SUBJECTIVE AND OBJECTIVE BOX
SUMMARY: 89F hx ?HTN, ?hypothyroid dementia, xfer from [place] w/ large brain bleed. Found unresponsive in bathtub. Initially . Intubated and started on cardene, d/c'd d/t/ hypotnesion. Arrives via EMS. Reportedly no AC/AP. Received Keppra at . No family present or contact info.     HOSPITAL COURSE:    24 HOUR EVENTS:     ADMISSION SCORES:   GCS: 3T HH: MF: NIHSS: ICH Score:    SEDATION SCORES:  RASS: CAM-ICU:     REVIEW OF SYSTEMS:    ALLERGIES: Allergies    No Known Allergies    Intolerances        VITALS/DATA/ORDERS: [] Reviewed    DEVICES:   [] Restraints [] PIVs [] ET tube [] central line [] PICC [] arterial line [] mchugh [] NGT/OGT [] EVD [] LD [] MANDI/HMV [] LiCOX [] ICP monitor [] Trach [] PEG [] Chest Tube [] other:    EXAMINATION:  General: No acute distress  HEENT: Anicteric sclerae  Cardiac: [id number]  Lungs: Clear  Abdomen: Soft, non-tender, +BS  Extremities: No c/c/e  Skin/Incision Site: Clean, dry and intact  Neurologic: JATINDER to nox, not fc, R 4 mm NR, L3mm NR, no cough gag, no corneals, nothing on ext x4 not overbreathing (apnea pause on vent) SUMMARY: 89F hx ?HTN, ?hypothyroid dementia, xfer from [place] w/ large brain bleed. Found unresponsive in bathtub. Initially . Intubated and started on cardene, d/c'd d/t/ hypotnesion. Arrives via EMS. Reportedly no AC/AP. Received Keppra at . No family present or contact info.     HOSPITAL COURSE: intubated at [place], transferred to ED, adm NSCU  CT/CTA done    ADMISSION SCORES:   GCS: 3T HH: MF: NIHSS: ICH Score: 5    REVIEW OF SYSTEMS: [x] Unable to Assess due to neurologic exam   [ ] All ROS addressed below are non-contributory, except:  Neuro: [ ] Headache [ ] Back pain [ ] Numbness [ ] Weakness [ ] Ataxia [ ] Dizziness [ ] Aphasia [ ] Dysarthria [ ] Visual disturbance  Resp: [ ] Shortness of breath/dyspnea [ ] Orthopnea [ ] Cough  CV: [ ] Chest pain [ ] Palpitation [ ] Lightheadedness [ ] Syncope  Renal: [ ] Thirst [ ] Edema  GI: [ ] Nausea [ ] Emesis [ ] Abdominal pain [ ] Constipation [ ] Diarrhea  Hem: [ ] Hematemesis [ ] bBright red blood per rectum  ID: [ ] Fever [ ] Chills [ ] Dysuria  ENT: [ ] Rhinorrhea    ALLERGIES: Allergies    No Known Allergies    Intolerances    VITALS/DATA/ORDERS: [] Reviewed    DEVICES:   [] Restraints [x] PIVs [x] ET tube [] central line [] PICC [] arterial line [] mchugh [x] NGT/OGT [] EVD [] LD [] MANDI/HMV [] LiCOX [] ICP monitor [] Trach [] PEG [] Chest Tube [] other:    EXAMINATION:  General: No acute distress  HEENT: Anicteric sclerae  Cardiac: [id number]  Lungs: Clear  Abdomen: Soft, non-tender, +BS  Extremities: No c/c/e  Skin/Incision Site: Clean, dry and intact  Neurologic: JATINDER to nox, not fc, R 4 mm NR, L3mm NR, no cough gag, no corneals, nothing on ext x4 not overbreathing (apnea pause on vent)

## 2021-12-02 NOTE — ED PROVIDER NOTE - CLINICAL SUMMARY MEDICAL DECISION MAKING FREE TEXT BOX
mary - 89 f htn hypothyroid trx from [facility] with head bleed sah and ich found in bathtub unresponved- initial bp 200 sbp - intubated and started on cardene but stopped 2/2 hypotension -- arrives as per ems 1.5 cm shift pt received keppra no ac - level 1 called -- pan scan and ct for concern for rupture anuerysm/avm -- rt pupils 5mm and non reactive not moving x4 no sedation poor prognosis, elev hob, consider hypertonic saline, and mild hypervent - - likely catostophhic head bleed will r/o other traumatic injury and admit to nsicu unk goals of care

## 2021-12-02 NOTE — PROVIDER CONTACT NOTE (CHANGE IN STATUS NOTIFICATION) - ACTION/TREATMENT ORDERED:
Patient to be treated with 2mg of Epinephrine Patient to be treated with 2mg of Epinephrine, CPR is being performed while CPR is being performed. Patient to be treated with 2mg of Epinephrine, while CPR is being performed.

## 2021-12-02 NOTE — PROGRESS NOTE ADULT - SUBJECTIVE AND OBJECTIVE BOX
SUMMARY: 89F hx ?HTN, ?hypothyroid dementia, xfer from [place] w/ large brain bleed. Found unresponsive in bathtub. Initially . Intubated and started on cardene, d/c'd d/t/ hypotnesion. Arrives via EMS. Reportedly no AC/AP. Received Keppra at . No family present or contact info.     HOSPITAL COURSE:   - admitted during the day placed on insulin gtt for DKA. On start of shift patient noted to have episodes of SSS, assessed at bedside and was found to have PEA arrest. ACLS activated with approximate downtime 12 minutes, given 1mg epinephrinex4 and calciumumx2, ROSC achieved within 12 minutes. Labs ordered and started on pressors. Patient began to fritz down again and went into PEA arrest with downtime 4 minutes in which additional 1mg epinephrinex2 were given. ROSC achieved, labs sent and patient found to be hyperkalemic to 7s with peaked T waves on EKG, given lokelma. Daughter was updated throughout and with detailed explanation of her clinical course and poor prognosis, all questions, concerns were answered and per the daughters wishes, the patient was made DNR.       ADMISSION SCORES:   GCS: 3T HH: MF: NIHSS: ICH Score: 5    REVIEW OF SYSTEMS:  [x] Unable to Assess due to neurologic exam       ALLERGIES: Allergies  No Known Allergies    Intolerances    VITALS/DATA/ORDERS: [] Reviewed      EXAMINATION:  General: ETT to vent  Cardiac: tachy  Lungs: mechanical bs  Abdomen: Soft, non-tender  Neurologic: JATINDER to nox, not fc, R 4 mm NR, L3mm NR, no cough gag, no corneals, no overbreathing, nothing all extremititesx4

## 2021-12-02 NOTE — PROGRESS NOTE ADULT - ASSESSMENT
ASSESSMENT:       NEURO:  Nchecks q1  Contact live on  Will try brain death once meets criteria      PULM:  Check abg    CV:  Keep -160mmHg    RENAL: Na corrected 145  HTS 2% 50 for goal 145-155  Montes    GI:  NPO, NGT  GI prophylaxis [] not indicated [] PPI [] other:  Bowel regimen [] colace [] senna [] other:    ENDO: concern for DKA  check a1c,  boh butirate  ins gtt, give K iv and po first  Goal euglycemia (-180)    HEME/ONC:  VTE prophylaxis: [] SCDs [] chemoprophylaxis [] hold chemoprophylaxis due to: [] high risk of DVT/PE on admission due to:    ID:  Kalli-op antibiotics    MISC:    SOCIAL/FAMILY:  [] awaiting [] updated at bedside [] family meeting    CODE STATUS:  [] Full Code [] DNR [] DNI [] Palliative/Comfort Care    DISPOSITION:  [] ICU [] Stroke Unit [] Floor [] EMU [] RCU [] PCU    [] Patient is at high risk of neurologic deterioration/death due to:     Time seen:  Time spent: ___ [] critical care minutes    Contact: [phone] ASSESSMENT: ICH/IVH, brain compression  DKA    NEURO:  Nchecks q1  Contact live on  Will try brain death once meets criteria  Hypertonics, Na goal 145-155  not a candidate for surgical intervention    PULM:  intubated, PRVC  vap bundle  Check abg    CV:  Keep -160mmHg    RENAL: Na corrected 145  HTS 2% 50 for goal 145-155  Montes    GI:  NPO, NGT  GI prophylaxis [] not indicated [x] PPI [] other:  Bowel regimen [] colace [x] senna [] other:    ENDO: concern for DKA  check a1c, beta hydroxybutyrate   ins gtt, give K iv and po first  Goal euglycemia (-180)  Q4 bmp till gap closes    HEME/ONC:  VTE prophylaxis: [] SCDs [] chemoprophylaxis [] hold chemoprophylaxis due to: [] high risk of DVT/PE on admission due to:    ID:  hypothermic warming blanket    MISC:    SOCIAL/FAMILY:  [x] daughter Argelia updated over the phone, will be here in person after work (8pm done)    CODE STATUS:  [x] Full Code [] DNR [] DNI [] Palliative/Comfort Care    DISPOSITION:  [x] ICU [] Stroke Unit [] Floor [] EMU [] RCU [] PCU    [x] Patient is at high risk of neurologic deterioration/death due to: ICH/IVH, brain compression, respiratory failure    Contact: [phone]

## 2021-12-02 NOTE — CHART NOTE - NSCHARTNOTEFT_GEN_A_CORE
CAPRINI SCORE [CLOT] Score on Admission for     AGE RELATED RISK FACTORS                                                       MOBILITY RELATED FACTORS  [ ] Age 41-60 years                                            (1 Point)                  [ ] Bed rest                                                        (1 Point)  [ ] Age: 61-74 years                                           (2 Points)                 [ ] Plaster cast                                                   (2 Points)  [ ] Age= 75 years                                              (3 Points)                 [ ] Bed bound for more than 72 hours                 (2 Points)    DISEASE RELATED RISK FACTORS                                               GENDER SPECIFIC FACTORS  [ ] Edema in the lower extremities                       (1 Point)                  [ ] Pregnancy                                                     (1 Point)  [ ] Varicose veins                                               (1 Point)                  [ ] Post-partum < 6 weeks                                   (1 Point)             [ ] BMI > 25 Kg/m2                                            (1 Point)                  [ ] Hormonal therapy  or oral contraception          (1 Point)                 [ ] Sepsis (in the previous month)                        (1 Point)                  [ ] History of pregnancy complications                 (1 point)  [ ] Pneumonia or serious lung disease                                               [ ] Unexplained or recurrent                     (1 Point)           (in the previous month)                               (1 Point)  [ ] Abnormal pulmonary function test                     (1 Point)                 SURGERY RELATED RISK FACTORS (include planned surgeries)  [ ] Acute myocardial infarction                              (1 Point)                 [ ]  Section                                             (1 Point)  [ ] Congestive heart failure (in the previous month)  (1 Point)               [ ] Minor surgery                                                  (1 Point)   [ ] Inflammatory bowel disease                             (1 Point)                 [ ] Arthroscopic surgery                                        (2 Points)  [ ] Central venous access                                      (2 Points)                [ ] General surgery lasting more than 45 minutes   (2 Points)       [ x] Stroke (in the previous month)                          (5 Points)               [ ] Elective arthroplasty                                         (5 Points)            [ ] Current or past malignancy                                (2 Points)                                                                                                     HEMATOLOGY RELATED FACTORS                                                 TRAUMA RELATED RISK FACTORS  [ ] Prior episodes of VTE                                     (3 Points)                [ ] Fracture of the hip, pelvis, or leg                       (5 Points)  [ ] Positive family history for VTE                         (3 Points)                 [ ] Acute spinal cord injury (in the previous month)  (5 Points)  [ ] Prothrombin 06635 A                                     (3 Points)                 [ ] Paralysis  (less than 1 month)                             (5 Points)  [ ] Factor V Leiden                                             (3 Points)                  [ ] Multiple Trauma within 1 month                        (5 Points)  [ ] Lupus anticoagulants                                     (3 Points)                                                           [ ] Anticardiolipin antibodies                               (3 Points)                                                       [ ] High homocysteine in the blood                      (3 Points)                                             [ ] Other congenital or acquired thrombophilia      (3 Points)                                                [ ] Heparin induced thrombocytopenia                  (3 Points)                                          Total Score [   5     ]    Risk:  Very low 0   Low 1 to 2   Moderate 3 to 4   High =5       VTE Prophylasix Recommednations:  [x ] mechanical pneumatic compression devices                                      [ ] contraindicated: _____________________  [ ] chemo prophylasix                                                                                   [ ] contraindicated _____________________    **** HIGH LIKELIHOOD DVT PRESENT ON ADMISSION  [ x] (please order LE dopplers within 24 hours of admission)

## 2021-12-03 LAB
ANION GAP SERPL CALC-SCNC: 10 MMOL/L — SIGNIFICANT CHANGE UP (ref 5–17)
ANION GAP SERPL CALC-SCNC: 11 MMOL/L — SIGNIFICANT CHANGE UP (ref 5–17)
ANION GAP SERPL CALC-SCNC: 12 MMOL/L — SIGNIFICANT CHANGE UP (ref 5–17)
BUN SERPL-MCNC: 41 MG/DL — HIGH (ref 7–23)
BUN SERPL-MCNC: 44 MG/DL — HIGH (ref 7–23)
BUN SERPL-MCNC: 45 MG/DL — HIGH (ref 7–23)
BUN SERPL-MCNC: 48 MG/DL — HIGH (ref 7–23)
BUN SERPL-MCNC: 48 MG/DL — HIGH (ref 7–23)
BUN SERPL-MCNC: 49 MG/DL — HIGH (ref 7–23)
BUN SERPL-MCNC: 51 MG/DL — HIGH (ref 7–23)
CALCIUM SERPL-MCNC: 10 MG/DL — SIGNIFICANT CHANGE UP (ref 8.4–10.5)
CALCIUM SERPL-MCNC: 10.9 MG/DL — HIGH (ref 8.4–10.5)
CALCIUM SERPL-MCNC: 8.9 MG/DL — SIGNIFICANT CHANGE UP (ref 8.4–10.5)
CALCIUM SERPL-MCNC: 9.2 MG/DL — SIGNIFICANT CHANGE UP (ref 8.4–10.5)
CALCIUM SERPL-MCNC: 9.2 MG/DL — SIGNIFICANT CHANGE UP (ref 8.4–10.5)
CALCIUM SERPL-MCNC: 9.3 MG/DL — SIGNIFICANT CHANGE UP (ref 8.4–10.5)
CALCIUM SERPL-MCNC: 9.3 MG/DL — SIGNIFICANT CHANGE UP (ref 8.4–10.5)
CHLORIDE SERPL-SCNC: 117 MMOL/L — HIGH (ref 96–108)
CHLORIDE SERPL-SCNC: 119 MMOL/L — HIGH (ref 96–108)
CHLORIDE SERPL-SCNC: 120 MMOL/L — HIGH (ref 96–108)
CHLORIDE SERPL-SCNC: 120 MMOL/L — HIGH (ref 96–108)
CHLORIDE SERPL-SCNC: 122 MMOL/L — HIGH (ref 96–108)
CO2 SERPL-SCNC: 16 MMOL/L — LOW (ref 22–31)
CO2 SERPL-SCNC: 16 MMOL/L — LOW (ref 22–31)
CO2 SERPL-SCNC: 17 MMOL/L — LOW (ref 22–31)
CO2 SERPL-SCNC: 19 MMOL/L — LOW (ref 22–31)
CO2 SERPL-SCNC: 20 MMOL/L — LOW (ref 22–31)
CREAT SERPL-MCNC: 2 MG/DL — HIGH (ref 0.5–1.3)
CREAT SERPL-MCNC: 2.28 MG/DL — HIGH (ref 0.5–1.3)
CREAT SERPL-MCNC: 2.49 MG/DL — HIGH (ref 0.5–1.3)
CREAT SERPL-MCNC: 2.67 MG/DL — HIGH (ref 0.5–1.3)
CREAT SERPL-MCNC: 2.79 MG/DL — HIGH (ref 0.5–1.3)
CREAT SERPL-MCNC: 2.95 MG/DL — HIGH (ref 0.5–1.3)
CREAT SERPL-MCNC: 3.12 MG/DL — HIGH (ref 0.5–1.3)
GLUCOSE SERPL-MCNC: 133 MG/DL — HIGH (ref 70–99)
GLUCOSE SERPL-MCNC: 167 MG/DL — HIGH (ref 70–99)
GLUCOSE SERPL-MCNC: 187 MG/DL — HIGH (ref 70–99)
GLUCOSE SERPL-MCNC: 195 MG/DL — HIGH (ref 70–99)
GLUCOSE SERPL-MCNC: 72 MG/DL — SIGNIFICANT CHANGE UP (ref 70–99)
GLUCOSE SERPL-MCNC: 87 MG/DL — SIGNIFICANT CHANGE UP (ref 70–99)
GLUCOSE SERPL-MCNC: 88 MG/DL — SIGNIFICANT CHANGE UP (ref 70–99)
MAGNESIUM SERPL-MCNC: 2.3 MG/DL — SIGNIFICANT CHANGE UP (ref 1.6–2.6)
MAGNESIUM SERPL-MCNC: 2.4 MG/DL — SIGNIFICANT CHANGE UP (ref 1.6–2.6)
MAGNESIUM SERPL-MCNC: 2.6 MG/DL — SIGNIFICANT CHANGE UP (ref 1.6–2.6)
PHOSPHATE SERPL-MCNC: 3.6 MG/DL — SIGNIFICANT CHANGE UP (ref 2.5–4.5)
PHOSPHATE SERPL-MCNC: 4 MG/DL — SIGNIFICANT CHANGE UP (ref 2.5–4.5)
PHOSPHATE SERPL-MCNC: 4.5 MG/DL — SIGNIFICANT CHANGE UP (ref 2.5–4.5)
POTASSIUM SERPL-MCNC: 6.6 MMOL/L — CRITICAL HIGH (ref 3.5–5.3)
POTASSIUM SERPL-MCNC: 6.7 MMOL/L — CRITICAL HIGH (ref 3.5–5.3)
POTASSIUM SERPL-MCNC: 6.7 MMOL/L — CRITICAL HIGH (ref 3.5–5.3)
POTASSIUM SERPL-MCNC: 6.9 MMOL/L — CRITICAL HIGH (ref 3.5–5.3)
POTASSIUM SERPL-MCNC: 7.2 MMOL/L — CRITICAL HIGH (ref 3.5–5.3)
POTASSIUM SERPL-MCNC: 7.6 MMOL/L — CRITICAL HIGH (ref 3.5–5.3)
POTASSIUM SERPL-MCNC: 7.9 MMOL/L — CRITICAL HIGH (ref 3.5–5.3)
POTASSIUM SERPL-SCNC: 6.6 MMOL/L — CRITICAL HIGH (ref 3.5–5.3)
POTASSIUM SERPL-SCNC: 6.7 MMOL/L — CRITICAL HIGH (ref 3.5–5.3)
POTASSIUM SERPL-SCNC: 6.7 MMOL/L — CRITICAL HIGH (ref 3.5–5.3)
POTASSIUM SERPL-SCNC: 6.9 MMOL/L — CRITICAL HIGH (ref 3.5–5.3)
POTASSIUM SERPL-SCNC: 7.2 MMOL/L — CRITICAL HIGH (ref 3.5–5.3)
POTASSIUM SERPL-SCNC: 7.6 MMOL/L — CRITICAL HIGH (ref 3.5–5.3)
POTASSIUM SERPL-SCNC: 7.9 MMOL/L — CRITICAL HIGH (ref 3.5–5.3)
SODIUM SERPL-SCNC: 145 MMOL/L — SIGNIFICANT CHANGE UP (ref 135–145)
SODIUM SERPL-SCNC: 146 MMOL/L — HIGH (ref 135–145)
SODIUM SERPL-SCNC: 147 MMOL/L — HIGH (ref 135–145)
SODIUM SERPL-SCNC: 147 MMOL/L — HIGH (ref 135–145)
SODIUM SERPL-SCNC: 148 MMOL/L — HIGH (ref 135–145)
SODIUM SERPL-SCNC: 149 MMOL/L — HIGH (ref 135–145)
SODIUM SERPL-SCNC: 149 MMOL/L — HIGH (ref 135–145)
TROPONIN T, HIGH SENSITIVITY RESULT: 671 NG/L — HIGH (ref 0–51)

## 2021-12-03 PROCEDURE — 71045 X-RAY EXAM CHEST 1 VIEW: CPT | Mod: 26

## 2021-12-03 PROCEDURE — 99291 CRITICAL CARE FIRST HOUR: CPT

## 2021-12-03 PROCEDURE — 93970 EXTREMITY STUDY: CPT | Mod: 26

## 2021-12-03 PROCEDURE — 93010 ELECTROCARDIOGRAM REPORT: CPT

## 2021-12-03 RX ORDER — SODIUM ZIRCONIUM CYCLOSILICATE 10 G/10G
10 POWDER, FOR SUSPENSION ORAL EVERY 8 HOURS
Refills: 0 | Status: DISCONTINUED | OUTPATIENT
Start: 2021-12-03 | End: 2021-12-04

## 2021-12-03 RX ORDER — NOREPINEPHRINE BITARTRATE/D5W 8 MG/250ML
0.03 PLASTIC BAG, INJECTION (ML) INTRAVENOUS
Qty: 32 | Refills: 0 | Status: DISCONTINUED | OUTPATIENT
Start: 2021-12-03 | End: 2021-12-03

## 2021-12-03 RX ORDER — INSULIN HUMAN 100 [IU]/ML
10 INJECTION, SOLUTION SUBCUTANEOUS ONCE
Refills: 0 | Status: COMPLETED | OUTPATIENT
Start: 2021-12-03 | End: 2021-12-03

## 2021-12-03 RX ORDER — DEXTROSE 10 % IN WATER 10 %
1000 INTRAVENOUS SOLUTION INTRAVENOUS
Refills: 0 | Status: DISCONTINUED | OUTPATIENT
Start: 2021-12-03 | End: 2021-12-04

## 2021-12-03 RX ORDER — SODIUM CHLORIDE 9 MG/ML
500 INJECTION INTRAMUSCULAR; INTRAVENOUS; SUBCUTANEOUS ONCE
Refills: 0 | Status: COMPLETED | OUTPATIENT
Start: 2021-12-03 | End: 2021-12-03

## 2021-12-03 RX ORDER — LACTULOSE 10 G/15ML
10 SOLUTION ORAL ONCE
Refills: 0 | Status: COMPLETED | OUTPATIENT
Start: 2021-12-03 | End: 2021-12-03

## 2021-12-03 RX ORDER — CALCIUM GLUCONATE 100 MG/ML
2 VIAL (ML) INTRAVENOUS ONCE
Refills: 0 | Status: DISCONTINUED | OUTPATIENT
Start: 2021-12-03 | End: 2021-12-03

## 2021-12-03 RX ORDER — DEXTROSE 50 % IN WATER 50 %
25 SYRINGE (ML) INTRAVENOUS ONCE
Refills: 0 | Status: COMPLETED | OUTPATIENT
Start: 2021-12-03 | End: 2021-12-03

## 2021-12-03 RX ORDER — CHLORHEXIDINE GLUCONATE 213 G/1000ML
1 SOLUTION TOPICAL
Refills: 0 | Status: DISCONTINUED | OUTPATIENT
Start: 2021-12-03 | End: 2021-12-04

## 2021-12-03 RX ORDER — SODIUM ZIRCONIUM CYCLOSILICATE 10 G/10G
5 POWDER, FOR SUSPENSION ORAL EVERY 8 HOURS
Refills: 0 | Status: DISCONTINUED | OUTPATIENT
Start: 2021-12-03 | End: 2021-12-03

## 2021-12-03 RX ORDER — SODIUM CHLORIDE 9 MG/ML
1000 INJECTION, SOLUTION INTRAVENOUS
Refills: 0 | Status: DISCONTINUED | OUTPATIENT
Start: 2021-12-03 | End: 2021-12-03

## 2021-12-03 RX ORDER — SODIUM CHLORIDE 9 MG/ML
1000 INJECTION INTRAMUSCULAR; INTRAVENOUS; SUBCUTANEOUS
Refills: 0 | Status: DISCONTINUED | OUTPATIENT
Start: 2021-12-03 | End: 2021-12-03

## 2021-12-03 RX ORDER — DEXTROSE 50 % IN WATER 50 %
50 SYRINGE (ML) INTRAVENOUS ONCE
Refills: 0 | Status: COMPLETED | OUTPATIENT
Start: 2021-12-03 | End: 2021-12-02

## 2021-12-03 RX ORDER — DEXTROSE 50 % IN WATER 50 %
50 SYRINGE (ML) INTRAVENOUS ONCE
Refills: 0 | Status: COMPLETED | OUTPATIENT
Start: 2021-12-03 | End: 2021-12-03

## 2021-12-03 RX ORDER — SODIUM BICARBONATE 1 MEQ/ML
50 SYRINGE (ML) INTRAVENOUS ONCE
Refills: 0 | Status: COMPLETED | OUTPATIENT
Start: 2021-12-03 | End: 2021-12-03

## 2021-12-03 RX ORDER — FUROSEMIDE 40 MG
20 TABLET ORAL ONCE
Refills: 0 | Status: COMPLETED | OUTPATIENT
Start: 2021-12-03 | End: 2021-12-03

## 2021-12-03 RX ORDER — ALBUTEROL 90 UG/1
10 AEROSOL, METERED ORAL ONCE
Refills: 0 | Status: COMPLETED | OUTPATIENT
Start: 2021-12-03 | End: 2021-12-03

## 2021-12-03 RX ORDER — DEXTROSE 50 % IN WATER 50 %
50 SYRINGE (ML) INTRAVENOUS
Refills: 0 | Status: COMPLETED | OUTPATIENT
Start: 2021-12-03 | End: 2021-12-03

## 2021-12-03 RX ORDER — POLYETHYLENE GLYCOL 3350 17 G/17G
17 POWDER, FOR SOLUTION ORAL
Refills: 0 | Status: DISCONTINUED | OUTPATIENT
Start: 2021-12-03 | End: 2021-12-04

## 2021-12-03 RX ORDER — NOREPINEPHRINE BITARTRATE/D5W 8 MG/250ML
1.6 PLASTIC BAG, INJECTION (ML) INTRAVENOUS
Qty: 32 | Refills: 0 | Status: DISCONTINUED | OUTPATIENT
Start: 2021-12-03 | End: 2021-12-04

## 2021-12-03 RX ORDER — INSULIN HUMAN 100 [IU]/ML
10 INJECTION, SOLUTION SUBCUTANEOUS ONCE
Refills: 0 | Status: DISCONTINUED | OUTPATIENT
Start: 2021-12-03 | End: 2021-12-03

## 2021-12-03 RX ORDER — LACTULOSE 10 G/15ML
20 SOLUTION ORAL EVERY 4 HOURS
Refills: 0 | Status: DISCONTINUED | OUTPATIENT
Start: 2021-12-03 | End: 2021-12-04

## 2021-12-03 RX ORDER — CALCIUM GLUCONATE 100 MG/ML
1 VIAL (ML) INTRAVENOUS ONCE
Refills: 0 | Status: COMPLETED | OUTPATIENT
Start: 2021-12-03 | End: 2021-12-03

## 2021-12-03 RX ORDER — SODIUM POLYSTYRENE SULFONATE 4.1 MEQ/G
15 POWDER, FOR SUSPENSION ORAL ONCE
Refills: 0 | Status: COMPLETED | OUTPATIENT
Start: 2021-12-03 | End: 2021-12-03

## 2021-12-03 RX ADMIN — SODIUM CHLORIDE 2 GRAM(S): 9 INJECTION INTRAMUSCULAR; INTRAVENOUS; SUBCUTANEOUS at 01:14

## 2021-12-03 RX ADMIN — ALBUTEROL 2.5 MILLIGRAM(S): 90 AEROSOL, METERED ORAL at 06:02

## 2021-12-03 RX ADMIN — Medication 50 MILLILITER(S): at 05:01

## 2021-12-03 RX ADMIN — SODIUM CHLORIDE 2 GRAM(S): 9 INJECTION INTRAMUSCULAR; INTRAVENOUS; SUBCUTANEOUS at 17:27

## 2021-12-03 RX ADMIN — CHLORHEXIDINE GLUCONATE 1 APPLICATION(S): 213 SOLUTION TOPICAL at 22:04

## 2021-12-03 RX ADMIN — INSULIN HUMAN 10 UNIT(S): 100 INJECTION, SOLUTION SUBCUTANEOUS at 09:36

## 2021-12-03 RX ADMIN — LACTULOSE 10 GRAM(S): 10 SOLUTION ORAL at 05:51

## 2021-12-03 RX ADMIN — Medication 50 MILLILITER(S): at 23:47

## 2021-12-03 RX ADMIN — Medication 25 MILLILITER(S): at 09:36

## 2021-12-03 RX ADMIN — CHLORHEXIDINE GLUCONATE 15 MILLILITER(S): 213 SOLUTION TOPICAL at 17:27

## 2021-12-03 RX ADMIN — Medication 25 MILLILITER(S): at 15:51

## 2021-12-03 RX ADMIN — SENNA PLUS 2 TABLET(S): 8.6 TABLET ORAL at 00:00

## 2021-12-03 RX ADMIN — SENNA PLUS 2 TABLET(S): 8.6 TABLET ORAL at 21:32

## 2021-12-03 RX ADMIN — Medication 50 MILLILITER(S): at 19:51

## 2021-12-03 RX ADMIN — INSULIN HUMAN 10 UNIT(S): 100 INJECTION, SOLUTION SUBCUTANEOUS at 15:51

## 2021-12-03 RX ADMIN — INSULIN HUMAN 10 UNIT(S): 100 INJECTION, SOLUTION SUBCUTANEOUS at 19:51

## 2021-12-03 RX ADMIN — POLYETHYLENE GLYCOL 3350 17 GRAM(S): 17 POWDER, FOR SOLUTION ORAL at 17:27

## 2021-12-03 RX ADMIN — LACOSAMIDE 120 MILLIGRAM(S): 50 TABLET ORAL at 05:08

## 2021-12-03 RX ADMIN — SODIUM CHLORIDE 2 GRAM(S): 9 INJECTION INTRAMUSCULAR; INTRAVENOUS; SUBCUTANEOUS at 05:58

## 2021-12-03 RX ADMIN — Medication 88 MICROGRAM(S): at 05:58

## 2021-12-03 RX ADMIN — LACOSAMIDE 120 MILLIGRAM(S): 50 TABLET ORAL at 17:36

## 2021-12-03 RX ADMIN — Medication 50 MILLIEQUIVALENT(S): at 19:51

## 2021-12-03 RX ADMIN — CHLORHEXIDINE GLUCONATE 15 MILLILITER(S): 213 SOLUTION TOPICAL at 05:04

## 2021-12-03 RX ADMIN — LATANOPROST 1 DROP(S): 0.05 SOLUTION/ DROPS OPHTHALMIC; TOPICAL at 21:31

## 2021-12-03 RX ADMIN — Medication 25 MILLILITER(S): at 14:15

## 2021-12-03 RX ADMIN — CHLORHEXIDINE GLUCONATE 1 APPLICATION(S): 213 SOLUTION TOPICAL at 05:08

## 2021-12-03 RX ADMIN — Medication 100 GRAM(S): at 09:36

## 2021-12-03 RX ADMIN — Medication 20 MILLIGRAM(S): at 05:04

## 2021-12-03 RX ADMIN — LACTULOSE 20 GRAM(S): 10 SOLUTION ORAL at 21:30

## 2021-12-03 RX ADMIN — LATANOPROST 1 DROP(S): 0.05 SOLUTION/ DROPS OPHTHALMIC; TOPICAL at 03:09

## 2021-12-03 RX ADMIN — SODIUM CHLORIDE 2 GRAM(S): 9 INJECTION INTRAMUSCULAR; INTRAVENOUS; SUBCUTANEOUS at 23:58

## 2021-12-03 RX ADMIN — Medication 25 MILLILITER(S): at 12:41

## 2021-12-03 RX ADMIN — INSULIN HUMAN 10 UNIT(S): 100 INJECTION, SOLUTION SUBCUTANEOUS at 12:41

## 2021-12-03 RX ADMIN — LACTULOSE 20 GRAM(S): 10 SOLUTION ORAL at 17:26

## 2021-12-03 RX ADMIN — SODIUM CHLORIDE 2 GRAM(S): 9 INJECTION INTRAMUSCULAR; INTRAVENOUS; SUBCUTANEOUS at 11:05

## 2021-12-03 RX ADMIN — POLYETHYLENE GLYCOL 3350 17 GRAM(S): 17 POWDER, FOR SOLUTION ORAL at 05:04

## 2021-12-03 RX ADMIN — Medication 2.7 MICROGRAM(S)/KG/MIN: at 05:05

## 2021-12-03 RX ADMIN — SODIUM ZIRCONIUM CYCLOSILICATE 5 GRAM(S): 10 POWDER, FOR SUSPENSION ORAL at 15:17

## 2021-12-03 RX ADMIN — SODIUM ZIRCONIUM CYCLOSILICATE 5 GRAM(S): 10 POWDER, FOR SUSPENSION ORAL at 06:56

## 2021-12-03 RX ADMIN — INSULIN HUMAN 10 UNIT(S): 100 INJECTION, SOLUTION SUBCUTANEOUS at 05:01

## 2021-12-03 RX ADMIN — PANTOPRAZOLE SODIUM 40 MILLIGRAM(S): 20 TABLET, DELAYED RELEASE ORAL at 11:05

## 2021-12-03 RX ADMIN — SODIUM CHLORIDE 500 MILLILITER(S): 9 INJECTION INTRAMUSCULAR; INTRAVENOUS; SUBCUTANEOUS at 05:04

## 2021-12-03 RX ADMIN — SODIUM ZIRCONIUM CYCLOSILICATE 10 GRAM(S): 10 POWDER, FOR SUSPENSION ORAL at 21:31

## 2021-12-03 NOTE — PROGRESS NOTE ADULT - ASSESSMENT
ASSESSMENT: ICH/IVH, brain compression  DKA    NEURO:  - Nchecks q1h  -  ongoing GOC with family  - not a candidate for surgical intervention    PULM:  - ETT to vent  - vap bundle  - CXR reviewed    CV:  - Keep -160mmHg  pressors prn  - ECG prn  - [place], TLC  - trend lactate, IVB  - troponin    RENAL:   - NS 50 cc  - Montes  - trend BMP  - strict IOs  - Monitor K+    GI:  - NPO, NGT  - GI prophylaxis PPI  - Bowel regimen     ENDO:   s/p insulin drip hyperglycemia protocol  - Goal euglycemia (-180)  - monitor FS      HEME/ONC:  - SCDs   - hold chemoprophylaxis     ID:  - monitor for fevers, hypothermic during the day placed on warming blanket  - f/u cultures    MISC:    SOCIAL/FAMILY:  [x] daughter Argelia updated over the phone, will continue GOC    CODE STATUS:  [] Full Code [x] DNR [] DNI [] Palliative/Comfort Care    DISPOSITION:  [x] ICU [] Stroke Unit [] Floor [] EMU [] RCU [] PCU    [x] Patient is at high risk of neurologic deterioration/death due to: ICH/IVH, brain compression, respiratory failure, cardiac arrest    Contact: [phone] ASSESSMENT: ICH/IVH, brain compression  DKA    NEURO:  - Nchecks q1h  -  ongoing GOC with family  - not a candidate for surgical intervention    PULM:  - ETT to vent  - vap bundle  - CXR reviewed    CV:  - Keep -160mmHg  pressors prn  - ECG prn  - [place], TLC  - trend lactate, IVB  - troponin    RENAL:   - D5 60 cc  - Montes  - trend BMP  - strict IOs  - Monitor K+    GI:  - NPO, NGT  - GI prophylaxis PPI  - Bowel regimen     ENDO:   s/p insulin 10 units w 1 amp d50 now to treat hyperkalemia  - Goal euglycemia (-180)  - monitor FS      HEME/ONC:  - SCDs   - hold chemoprophylaxis     ID:  - monitor for fevers, hypothermic during the day placed on warming blanket  - f/u cultures    MISC:    SOCIAL/FAMILY:  [x] daughter Argelia updated over the phone, will continue GOC    CODE STATUS:  [] Full Code [x] DNR [] DNI [] Palliative/Comfort Care    DISPOSITION:  [x] ICU [] Stroke Unit [] Floor [] EMU [] RCU [] PCU    [x] Patient is at high risk of neurologic deterioration/death due to: ICH/IVH, brain compression, respiratory failure, cardiac arrest    Contact: [phone]

## 2021-12-03 NOTE — OCCUPATIONAL THERAPY INITIAL EVALUATION ADULT - PERTINENT HX OF CURRENT PROBLEM, REHAB EVAL
89F hx ?HTN, ?hypothyroid dementia, xfer from [place] w/ large brain bleed. Found unresponsive in bathtub. Initially . Intubated and started on cardene, d/c'd d/t/ hypotnesion. Arrives via EMS. Reportedly no AC/AP. Received Keppra at . No family present or contact info.

## 2021-12-03 NOTE — PROVIDER CONTACT NOTE (CRITICAL VALUE NOTIFICATION) - ACTION/TREATMENT ORDERED:
MD Haywood notified. Patient to be given insulin, D5, lasix and have an EKG done per providers orders

## 2021-12-03 NOTE — PROGRESS NOTE ADULT - SUBJECTIVE AND OBJECTIVE BOX
SUMMARY: 89F hx ?HTN, ?hypothyroid dementia, xfer from [place] w/ large brain bleed. Found unresponsive in bathtub. Initially . Intubated and started on cardene, d/c'd d/t/ hypotnesion. Arrives via EMS. Reportedly no AC/AP. Received Keppra at . No family present or contact info.     24H events  - overnight episodes of SSS, then patient PEA arrest. ACLS activated with approximate downtime 12 minutes, given 1mg epinephrinex4 and calciumumx2, ROSC achieved within 12 minutes. Labs ordered and started on pressors. Patient began to fritz down again and went into PEA arrest with downtime 4 minutes in which additional 1mg epinephrinex2 were given. ROSC achieved, labs sent and patient found to be hyperkalemic to 7s with peaked T waves on EKG, given lokelma. Daughter was updated throughout and with detailed explanation of her clinical course and poor prognosis, all questions, concerns were answered and per the daughters wishes, the patient was made DNR DNI    REVIEW OF SYSTEMS:  [x] Unable to Assess due to neurologic exam       ALLERGIES:   No Known Allergies    Intolerances    VITALS/DATA/ORDERS: [x] Reviewed      EXAMINATION:  General: ETT to vent  Cardiac: tachy  Lungs: mechanical bs  Abdomen: Soft, non-tender  Neurologic: JATINDER to nox, not fc, R 4 mm NR, L3mm NR, no cough gag, no corneals, no overbreathing, nothing all extremititesx4

## 2021-12-03 NOTE — SPEECH LANGUAGE PATHOLOGY EVALUATION - SLP PERTINENT HISTORY OF CURRENT PROBLEM
89y old f found down transferred from outside hospital unresponsive, intubated, CTH in outside hospital found with intraparenchymal and subarachnoid hemorrhage, transferred here for planned admission to neuro ICU. Level 1 trauma was called upon arrival.

## 2021-12-03 NOTE — SPEECH LANGUAGE PATHOLOGY EVALUATION - SLP DIAGNOSIS
Orders received for speech language evaluation, chart reviewed to date. Per NSCU rounds, patient not appropriate to be seen at this time. Evaluation deferred at this time. This service to be re-consulted as needed.

## 2021-12-03 NOTE — PROGRESS NOTE ADULT - ASSESSMENT
ASSESSMENT: ICH/IVH, brain compression  DKA    NEURO:  - Nchecks q1h  - plan for palliative extubation on saturday  - not a candidate for surgical intervention    PULM:  - ETT to vent  - vap bundle      CV:  - Keep -160mmHg  - [place], TLC  - trend lactate      RENAL:   - FLuids: D5@30  - Montes  - BMPq4h, monitor potassium with repeat hyperK cocktail, worsening renal function, not HD candidate, will continue with conservative measures per family wishes  - strict IOs  - lokelma TID w/ laxative     GI:  - NPO, NGT  - GI prophylaxis PPI  - Bowel regimen     ENDO:   s/p insulin drip for DKA during day  - Goal euglycemia (-180)  - monitor FS      HEME/ONC:  - SCDs   - hold chemoprophylaxis due to fresh bleed    ID:  - monitor for fevers, hypothermic during the day placed on warming blanket  - f/u cultures    MISC:    SOCIAL/FAMILY:  [x] daughter Argelia updated and aware of plan    CODE STATUS:  [] Full Code [x] DNR [] DNI [] Palliative/Comfort Care    DISPOSITION:  [x] ICU [] Stroke Unit [] Floor [] EMU [] RCU [] PCU    [x] Patient is at high risk of neurologic deterioration/death due to: ICH/IVH, brain compression, respiratory failure, cardiac arrest    Contact: [phone] ASSESSMENT: ICH/IVH, brain compression  DKA    NEURO:  - plan for palliative extubation on saturday  - not a candidate for surgical intervention    PULM:  - ETT to vent  - vap bundle      CV:  - Keep -160mmHg  - [place], TLC  - trend lactate      RENAL:   - FLuids: D5@30  - Montes  - BMPq4h, monitor potassium with repeat hyperK cocktail, worsening renal function, not HD candidate, will continue with conservative measures per family wishes  - strict IOs  - lokelma TID w/ laxative     GI:  - NPO, NGT  - GI prophylaxis PPI  - Bowel regimen     ENDO:   s/p insulin drip for DKA during day  - Goal euglycemia (-180)  - monitor FS      HEME/ONC:  - SCDs   - hold chemoprophylaxis due to fresh bleed    ID:  - monitor for fevers, hypothermic during the day placed on warming blanket  - f/u cultures    MISC:    SOCIAL/FAMILY:  [x] daughter Argelia updated and aware of plan    CODE STATUS:  [] Full Code [x] DNR [] DNI [] Palliative/Comfort Care    DISPOSITION:  [x] ICU [] Stroke Unit [] Floor [] EMU [] RCU [] PCU    [x] Patient is at high risk of neurologic deterioration/death due to: ICH/IVH, brain compression, respiratory failure, cardiac arrest    Contact: [phone]

## 2021-12-03 NOTE — PROGRESS NOTE ADULT - SUBJECTIVE AND OBJECTIVE BOX
SUMMARY: 89F hx ?HTN, ?hypothyroid dementia, xfer from [place] w/ large brain bleed. Found unresponsive in bathtub. Initially . Intubated and started on cardene, d/c'd d/t/ hypotnesion. Arrives via EMS. Reportedly no AC/AP. Received Keppra at . No family present or contact info.     HOSPITAL COURSE:   - hyperkalemic given multiple rounds of hyperK cocktail  - per discussion with family, plam for palliative extubation tomorrow       ADMISSION SCORES:   GCS: 3T HH: MF: NIHSS: ICH Score: 5    REVIEW OF SYSTEMS:  [x] Unable to Assess due to neurologic exam       ALLERGIES: Allergies  No Known Allergies    Intolerances    VITALS/DATA/ORDERS: [] Reviewed      EXAMINATION:  General: ETT to vent  Cardiac: tachy  Lungs: mechanical bs  Abdomen: Soft, non-tender  Neurologic: JATINDER to nox, not fc, R 4 mm NR, L3mm NR, no cough gag, no corneals, no overbreathing, nothing all extremititesx4 SUMMARY: 89F hx ?HTN, ?hypothyroid dementia, xfer from [place] w/ large brain bleed. Found unresponsive in bathtub. Initially . Intubated and started on cardene, d/c'd d/t/ hypotnesion. Arrives via EMS. Reportedly no AC/AP. Received Keppra at . No family present or contact info.     HOSPITAL COURSE:   - hyperkalemic given multiple rounds of hyperK cocktail  - per discussion with family, plan for palliative extubation tomorrow       ADMISSION SCORES:   GCS: 3T HH: MF: NIHSS: ICH Score: 5    REVIEW OF SYSTEMS:  [x] Unable to Assess due to neurologic exam       ALLERGIES: Allergies  No Known Allergies    Intolerances    VITALS/DATA/ORDERS: [] Reviewed      EXAMINATION:  General: ETT to vent  Cardiac: tachy  Lungs: mechanical bs  Abdomen: Soft, non-tender  Neurologic: JATINDER to nox, not fc, R 4 mm NR, L3mm NR, no cough gag, no corneals, no overbreathing, nothing all extremititesx4

## 2021-12-04 VITALS — HEART RATE: 49 BPM | OXYGEN SATURATION: 100 %

## 2021-12-04 LAB
ANION GAP SERPL CALC-SCNC: 12 MMOL/L — SIGNIFICANT CHANGE UP (ref 5–17)
ANION GAP SERPL CALC-SCNC: 14 MMOL/L — SIGNIFICANT CHANGE UP (ref 5–17)
BUN SERPL-MCNC: 50 MG/DL — HIGH (ref 7–23)
BUN SERPL-MCNC: 51 MG/DL — HIGH (ref 7–23)
CALCIUM SERPL-MCNC: 9 MG/DL — SIGNIFICANT CHANGE UP (ref 8.4–10.5)
CALCIUM SERPL-MCNC: 9.2 MG/DL — SIGNIFICANT CHANGE UP (ref 8.4–10.5)
CHLORIDE SERPL-SCNC: 117 MMOL/L — HIGH (ref 96–108)
CHLORIDE SERPL-SCNC: 119 MMOL/L — HIGH (ref 96–108)
CO2 SERPL-SCNC: 15 MMOL/L — LOW (ref 22–31)
CO2 SERPL-SCNC: 16 MMOL/L — LOW (ref 22–31)
CREAT SERPL-MCNC: 3.25 MG/DL — HIGH (ref 0.5–1.3)
CREAT SERPL-MCNC: 3.38 MG/DL — HIGH (ref 0.5–1.3)
GLUCOSE SERPL-MCNC: 125 MG/DL — HIGH (ref 70–99)
GLUCOSE SERPL-MCNC: 171 MG/DL — HIGH (ref 70–99)
MAGNESIUM SERPL-MCNC: 2.2 MG/DL — SIGNIFICANT CHANGE UP (ref 1.6–2.6)
PHOSPHATE SERPL-MCNC: 4 MG/DL — SIGNIFICANT CHANGE UP (ref 2.5–4.5)
POTASSIUM SERPL-MCNC: 6 MMOL/L — HIGH (ref 3.5–5.3)
POTASSIUM SERPL-MCNC: 6.3 MMOL/L — CRITICAL HIGH (ref 3.5–5.3)
POTASSIUM SERPL-SCNC: 6 MMOL/L — HIGH (ref 3.5–5.3)
POTASSIUM SERPL-SCNC: 6.3 MMOL/L — CRITICAL HIGH (ref 3.5–5.3)
SODIUM SERPL-SCNC: 146 MMOL/L — HIGH (ref 135–145)
SODIUM SERPL-SCNC: 147 MMOL/L — HIGH (ref 135–145)

## 2021-12-04 PROCEDURE — 99291 CRITICAL CARE FIRST HOUR: CPT

## 2021-12-04 PROCEDURE — 71045 X-RAY EXAM CHEST 1 VIEW: CPT | Mod: 26

## 2021-12-04 RX ORDER — INSULIN HUMAN 100 [IU]/ML
10 INJECTION, SOLUTION SUBCUTANEOUS ONCE
Refills: 0 | Status: COMPLETED | OUTPATIENT
Start: 2021-12-04 | End: 2021-12-04

## 2021-12-04 RX ORDER — MORPHINE SULFATE 50 MG/1
1 CAPSULE, EXTENDED RELEASE ORAL
Qty: 100 | Refills: 0 | Status: DISCONTINUED | OUTPATIENT
Start: 2021-12-04 | End: 2021-12-04

## 2021-12-04 RX ORDER — MORPHINE SULFATE 50 MG/1
2 CAPSULE, EXTENDED RELEASE ORAL ONCE
Refills: 0 | Status: DISCONTINUED | OUTPATIENT
Start: 2021-12-04 | End: 2021-12-04

## 2021-12-04 RX ORDER — DEXTROSE 50 % IN WATER 50 %
50 SYRINGE (ML) INTRAVENOUS ONCE
Refills: 0 | Status: COMPLETED | OUTPATIENT
Start: 2021-12-04 | End: 2021-12-04

## 2021-12-04 RX ORDER — CHLORHEXIDINE GLUCONATE 213 G/1000ML
1 SOLUTION TOPICAL DAILY
Refills: 0 | Status: DISCONTINUED | OUTPATIENT
Start: 2021-12-04 | End: 2021-12-04

## 2021-12-04 RX ORDER — ROBINUL 0.2 MG/ML
0.2 INJECTION INTRAMUSCULAR; INTRAVENOUS EVERY 4 HOURS
Refills: 0 | Status: DISCONTINUED | OUTPATIENT
Start: 2021-12-04 | End: 2021-12-04

## 2021-12-04 RX ADMIN — Medication 30 MILLILITER(S): at 05:21

## 2021-12-04 RX ADMIN — INSULIN HUMAN 10 UNIT(S): 100 INJECTION, SOLUTION SUBCUTANEOUS at 04:48

## 2021-12-04 RX ADMIN — CHLORHEXIDINE GLUCONATE 15 MILLILITER(S): 213 SOLUTION TOPICAL at 05:23

## 2021-12-04 RX ADMIN — PANTOPRAZOLE SODIUM 40 MILLIGRAM(S): 20 TABLET, DELAYED RELEASE ORAL at 12:25

## 2021-12-04 RX ADMIN — MORPHINE SULFATE 2 MILLIGRAM(S): 50 CAPSULE, EXTENDED RELEASE ORAL at 16:55

## 2021-12-04 RX ADMIN — LACTULOSE 20 GRAM(S): 10 SOLUTION ORAL at 13:27

## 2021-12-04 RX ADMIN — LACOSAMIDE 120 MILLIGRAM(S): 50 TABLET ORAL at 05:21

## 2021-12-04 RX ADMIN — LACTULOSE 20 GRAM(S): 10 SOLUTION ORAL at 09:38

## 2021-12-04 RX ADMIN — Medication 86.3 MICROGRAM(S)/KG/MIN: at 07:06

## 2021-12-04 RX ADMIN — Medication 86.3 MICROGRAM(S)/KG/MIN: at 05:22

## 2021-12-04 RX ADMIN — SODIUM CHLORIDE 2 GRAM(S): 9 INJECTION INTRAMUSCULAR; INTRAVENOUS; SUBCUTANEOUS at 05:21

## 2021-12-04 RX ADMIN — Medication 30 MILLILITER(S): at 08:06

## 2021-12-04 RX ADMIN — SODIUM ZIRCONIUM CYCLOSILICATE 10 GRAM(S): 10 POWDER, FOR SUSPENSION ORAL at 05:23

## 2021-12-04 RX ADMIN — SODIUM CHLORIDE 2 GRAM(S): 9 INJECTION INTRAMUSCULAR; INTRAVENOUS; SUBCUTANEOUS at 12:24

## 2021-12-04 RX ADMIN — SODIUM ZIRCONIUM CYCLOSILICATE 10 GRAM(S): 10 POWDER, FOR SUSPENSION ORAL at 13:27

## 2021-12-04 RX ADMIN — Medication 50 MILLILITER(S): at 04:48

## 2021-12-04 RX ADMIN — LACTULOSE 20 GRAM(S): 10 SOLUTION ORAL at 05:25

## 2021-12-04 RX ADMIN — Medication 88 MICROGRAM(S): at 05:21

## 2021-12-04 RX ADMIN — INSULIN HUMAN 10 UNIT(S): 100 INJECTION, SOLUTION SUBCUTANEOUS at 00:01

## 2021-12-04 RX ADMIN — POLYETHYLENE GLYCOL 3350 17 GRAM(S): 17 POWDER, FOR SOLUTION ORAL at 05:25

## 2021-12-04 RX ADMIN — MORPHINE SULFATE 2 MILLIGRAM(S): 50 CAPSULE, EXTENDED RELEASE ORAL at 17:15

## 2021-12-04 NOTE — PROGRESS NOTE ADULT - ASSESSMENT
ASSESSMENT:89yoF  ICH/IVH, brain compression  DKA    NEURO:  - plan for palliative extubation on saturday  - not a candidate for surgical intervention  Not a candidate for organ donation per [employer]  nchecksq4 coma    PULM:  - ETT to vent  - vap bundle      CV:  - Keep -160mmHg  - [place], TLC        RENAL:   - FLuids: D10@30  - Montes  - BMPq4h, monitor potassium with repeat hyperK cocktail, worsening renal function, not HD candidate, will continue with conservative measures per family wishes  - strict IOs  - lokelma TID w/ laxative s/p lasix    GI:  - NPO, NGT  - GI prophylaxis PPI  - Bowel regimen     ENDO:   s/p insulin drip, cont D10  - Goal euglycemia (-180)  - monitor FS      HEME/ONC:  - SCDs   - hold chemoprophylaxis due to fresh bleed    ID:  - monitor for fevers, hypothermic during the day placed on warming blanket  - f/u cultures    MISC:    SOCIAL/FAMILY:  [x] daughter Argelia updated and aware of plan    CODE STATUS:  [] Full Code [x] DNR [] DNI [] Palliative/Comfort Care    DISPOSITION:  [x] ICU [] Stroke Unit [] Floor [] EMU [] RCU [] PCU    [x] Patient is at high risk of neurologic deterioration/death due to: ICH/IVH, brain compression, respiratory failure, cardiac arrest    Contact: [phone] ASSESSMENT:89yoF  ICH/IVH, brain compression  DKA    NEURO:  - plan for palliative extubation on saturday  - not a candidate for surgical intervention  Not a candidate for organ donation per [employer]  nchecksq4 coma    PULM:  - ETT to vent  - vap bundle      CV:  - Keep MAP>65  - [place], TLC        RENAL:   - FLuids: D10@30  - Montes  - BMPq4h, monitor potassium with repeat hyperK cocktail, worsening renal function, not HD candidate, will continue with conservative measures per family wishes  - strict IOs  - lokelma TID w/ laxative s/p lasix    GI:  - NPO, NGT  - GI prophylaxis PPI  - Bowel regimen     ENDO:   s/p insulin drip, cont D10  - Goal euglycemia (-180)  - monitor FS      HEME/ONC:  - SCDs   - hold chemoprophylaxis due to fresh bleed    ID:  - monitor for fevers, hypothermic during the day placed on warming blanket  - f/u cultures    MISC:    SOCIAL/FAMILY:  [x] daughter Argelia updated and aware of plan she stated yesterday she would come saturday evening to make pt extubate and palliative care    CODE STATUS:  [] Full Code [x] DNR [] DNI [] Palliative/Comfort Care    DISPOSITION:  [x] ICU [] Stroke Unit [] Floor [] EMU [] RCU [] PCU    [x] Patient is at high risk of neurologic deterioration/death due to: ICH/IVH, brain compression, respiratory failure, cardiac arrest    Contact: [phone]

## 2021-12-04 NOTE — PROGRESS NOTE ADULT - ATTENDING COMMENTS
Agree with above.
Patient seen, examined and case d/w fellow.     On-going goals of care. Assess for brain death if hemodynamically stable.
Agree with above.
Agree with/edited as appropriate above
Patient seen, examined and case d/w fellow.     Maintain current care until goals of care finalized.

## 2021-12-04 NOTE — PATIENT PROFILE ADULT - FALL HARM RISK - HARM RISK INTERVENTIONS

## 2021-12-04 NOTE — AIRWAY REMOVAL NOTE  ADULT & PEDS - ARTIFICAL AIRWAY REMOVAL COMMENTS
Written order for extubation verified. The patient was identified by full name and birth date compared to the identification band.  Present during the procedure was Joanne SPAIN

## 2021-12-04 NOTE — PROGRESS NOTE ADULT - SUBJECTIVE AND OBJECTIVE BOX
SUMMARY: 89F hx ?HTN, ?hypothyroid dementia, xfer from [place] w/ large brain bleed. Found unresponsive in bathtub. Initially . Intubated and started on cardene, d/c'd d/t/ hypotnesion. Arrives via EMS. Reportedly no AC/AP. Received Keppra at . No family present or contact info.     HOSPITAL COURSE:   - hyperkalemic given multiple rounds of hyperK cocktail  - per discussion with family, plan for palliative extubation tonight      ADMISSION SCORES:   GCS: 3T HH: MF: NIHSS: ICH Score: 5    REVIEW OF SYSTEMS:  [x] Unable to Assess due to neurologic exam       ALLERGIES: Allergies  No Known Allergies    Intolerances    VITALS/DATA/ORDERS: [x] Reviewed      EXAMINATION:  General: ETT to vent  Cardiac: tachy  Lungs: mechanical bs  Abdomen: Soft, non-tender  Neurologic: JATINDER to nox, not fc, R 4 mm NR, L3mm NR, no cough gag, no corneals, no overbreathing, nothing all extremititesx4

## 2021-12-22 PROCEDURE — 82040 ASSAY OF SERUM ALBUMIN: CPT

## 2021-12-22 PROCEDURE — 84436 ASSAY OF TOTAL THYROXINE: CPT

## 2021-12-22 PROCEDURE — 82435 ASSAY OF BLOOD CHLORIDE: CPT

## 2021-12-22 PROCEDURE — 94640 AIRWAY INHALATION TREATMENT: CPT

## 2021-12-22 PROCEDURE — 83735 ASSAY OF MAGNESIUM: CPT

## 2021-12-22 PROCEDURE — 85027 COMPLETE CBC AUTOMATED: CPT

## 2021-12-22 PROCEDURE — 84443 ASSAY THYROID STIM HORMONE: CPT

## 2021-12-22 PROCEDURE — 74177 CT ABD & PELVIS W/CONTRAST: CPT | Mod: MA

## 2021-12-22 PROCEDURE — 84132 ASSAY OF SERUM POTASSIUM: CPT

## 2021-12-22 PROCEDURE — 82330 ASSAY OF CALCIUM: CPT

## 2021-12-22 PROCEDURE — 86901 BLOOD TYPING SEROLOGIC RH(D): CPT

## 2021-12-22 PROCEDURE — 86850 RBC ANTIBODY SCREEN: CPT

## 2021-12-22 PROCEDURE — 71045 X-RAY EXAM CHEST 1 VIEW: CPT

## 2021-12-22 PROCEDURE — 84480 ASSAY TRIIODOTHYRONINE (T3): CPT

## 2021-12-22 PROCEDURE — 94002 VENT MGMT INPAT INIT DAY: CPT

## 2021-12-22 PROCEDURE — 36415 COLL VENOUS BLD VENIPUNCTURE: CPT

## 2021-12-22 PROCEDURE — 85018 HEMOGLOBIN: CPT

## 2021-12-22 PROCEDURE — 72170 X-RAY EXAM OF PELVIS: CPT

## 2021-12-22 PROCEDURE — 80076 HEPATIC FUNCTION PANEL: CPT

## 2021-12-22 PROCEDURE — 85730 THROMBOPLASTIN TIME PARTIAL: CPT

## 2021-12-22 PROCEDURE — 85025 COMPLETE CBC W/AUTO DIFF WBC: CPT

## 2021-12-22 PROCEDURE — 83036 HEMOGLOBIN GLYCOSYLATED A1C: CPT

## 2021-12-22 PROCEDURE — 82803 BLOOD GASES ANY COMBINATION: CPT

## 2021-12-22 PROCEDURE — 93970 EXTREMITY STUDY: CPT

## 2021-12-22 PROCEDURE — 83605 ASSAY OF LACTIC ACID: CPT

## 2021-12-22 PROCEDURE — 83690 ASSAY OF LIPASE: CPT

## 2021-12-22 PROCEDURE — 84100 ASSAY OF PHOSPHORUS: CPT

## 2021-12-22 PROCEDURE — 80048 BASIC METABOLIC PNL TOTAL CA: CPT

## 2021-12-22 PROCEDURE — 70450 CT HEAD/BRAIN W/O DYE: CPT | Mod: MA

## 2021-12-22 PROCEDURE — 71260 CT THORAX DX C+: CPT | Mod: MA

## 2021-12-22 PROCEDURE — U0003: CPT

## 2021-12-22 PROCEDURE — 93306 TTE W/DOPPLER COMPLETE: CPT

## 2021-12-22 PROCEDURE — 82565 ASSAY OF CREATININE: CPT

## 2021-12-22 PROCEDURE — 82010 KETONE BODYS QUAN: CPT

## 2021-12-22 PROCEDURE — 85014 HEMATOCRIT: CPT

## 2021-12-22 PROCEDURE — 84484 ASSAY OF TROPONIN QUANT: CPT

## 2021-12-22 PROCEDURE — 86900 BLOOD TYPING SEROLOGIC ABO: CPT

## 2021-12-22 PROCEDURE — 80061 LIPID PANEL: CPT

## 2021-12-22 PROCEDURE — 70498 CT ANGIOGRAPHY NECK: CPT | Mod: MA

## 2021-12-22 PROCEDURE — 93005 ELECTROCARDIOGRAM TRACING: CPT

## 2021-12-22 PROCEDURE — 80307 DRUG TEST PRSMV CHEM ANLYZR: CPT

## 2021-12-22 PROCEDURE — C9254: CPT

## 2021-12-22 PROCEDURE — 80053 COMPREHEN METABOLIC PANEL: CPT

## 2021-12-22 PROCEDURE — 84295 ASSAY OF SERUM SODIUM: CPT

## 2021-12-22 PROCEDURE — 72125 CT NECK SPINE W/O DYE: CPT | Mod: MA

## 2021-12-22 PROCEDURE — 99285 EMERGENCY DEPT VISIT HI MDM: CPT

## 2021-12-22 PROCEDURE — 70496 CT ANGIOGRAPHY HEAD: CPT | Mod: MA

## 2021-12-22 PROCEDURE — 94003 VENT MGMT INPAT SUBQ DAY: CPT

## 2021-12-22 PROCEDURE — 85610 PROTHROMBIN TIME: CPT

## 2021-12-22 PROCEDURE — 82962 GLUCOSE BLOOD TEST: CPT

## 2021-12-22 PROCEDURE — 82947 ASSAY GLUCOSE BLOOD QUANT: CPT

## 2022-03-14 RX ORDER — LEVOTHYROXINE SODIUM 125 MCG
1 TABLET ORAL
Qty: 0 | Refills: 0 | DISCHARGE

## 2022-03-14 RX ORDER — DONEPEZIL HYDROCHLORIDE 10 MG/1
1 TABLET, FILM COATED ORAL
Qty: 0 | Refills: 0 | DISCHARGE

## 2022-03-14 RX ORDER — LORATADINE 10 MG/1
1 TABLET ORAL
Qty: 0 | Refills: 0 | DISCHARGE

## 2022-03-14 RX ORDER — VALSARTAN 80 MG/1
1 TABLET ORAL
Qty: 0 | Refills: 0 | DISCHARGE

## 2022-03-14 RX ORDER — MEMANTINE HYDROCHLORIDE 10 MG/1
1 TABLET ORAL
Qty: 0 | Refills: 0 | DISCHARGE

## 2022-03-14 RX ORDER — TRAVOPROST 0.04 MG/ML
1 SOLUTION/ DROPS OPHTHALMIC
Qty: 0 | Refills: 0 | DISCHARGE

## 2023-10-18 NOTE — PATIENT PROFILE ADULT - FUNCTIONAL ASSESSMENT - BASIC MOBILITY 2.
Goal Outcome Evaluation:pt transferred to tele due to Bradycardia in 40's.  HR dropped to 37-39 consistently throughout the shift. Pt became slightly nauseous x1 however did not require intervention and no vomiting. Pt was aaox4 and asymptomatic with each episode, HR returned to 60's within a couple of seconds.                          1 = Total assistance

## 2024-07-10 NOTE — PATIENT PROFILE ADULT - FUNCTIONAL ASSESSMENT - BASIC MOBILITY SCORE.
Detail Level: Detailed
Quality 226: Preventive Care And Screening: Tobacco Use: Screening And Cessation Intervention: Patient screened for tobacco use and is an ex/non-smoker
Quality 130: Documentation Of Current Medications In The Medical Record: Current Medications Documented
6